# Patient Record
Sex: MALE | Race: BLACK OR AFRICAN AMERICAN | Employment: FULL TIME | URBAN - METROPOLITAN AREA
[De-identification: names, ages, dates, MRNs, and addresses within clinical notes are randomized per-mention and may not be internally consistent; named-entity substitution may affect disease eponyms.]

---

## 2019-02-03 ENCOUNTER — HOSPITAL ENCOUNTER (INPATIENT)
Age: 39
LOS: 2 days | Discharge: HOME | DRG: 293 | End: 2019-02-05
Attending: EMERGENCY MEDICINE | Admitting: INTERNAL MEDICINE
Payer: COMMERCIAL

## 2019-02-03 ENCOUNTER — APPOINTMENT (OUTPATIENT)
Dept: GENERAL RADIOLOGY | Age: 39
End: 2019-02-03
Attending: INTERNAL MEDICINE
Payer: COMMERCIAL

## 2019-02-03 ENCOUNTER — HOSPITAL ENCOUNTER (EMERGENCY)
Age: 39
Discharge: ANOTHER HOSPITAL | End: 2019-02-03
Attending: INTERNAL MEDICINE
Payer: COMMERCIAL

## 2019-02-03 VITALS
OXYGEN SATURATION: 96 % | HEART RATE: 103 BPM | BODY MASS INDEX: 39.58 KG/M2 | WEIGHT: 300 LBS | RESPIRATION RATE: 18 BRPM | DIASTOLIC BLOOD PRESSURE: 137 MMHG | SYSTOLIC BLOOD PRESSURE: 178 MMHG | TEMPERATURE: 98.3 F

## 2019-02-03 DIAGNOSIS — I21.4 NSTEMI (NON-ST ELEVATED MYOCARDIAL INFARCTION) (HCC): Primary | ICD-10-CM

## 2019-02-03 DIAGNOSIS — I50.9 ACUTE CONGESTIVE HEART FAILURE, UNSPECIFIED HEART FAILURE TYPE (HCC): ICD-10-CM

## 2019-02-03 DIAGNOSIS — I42.9 CARDIOMYOPATHY (HCC): ICD-10-CM

## 2019-02-03 LAB
ALBUMIN SERPL-MCNC: 2.4 G/DL (ref 3.5–5)
ALBUMIN/GLOB SERPL: 0.6 {RATIO} (ref 1.1–2.2)
ALP SERPL-CCNC: 100 U/L (ref 45–117)
ALT SERPL-CCNC: 39 U/L (ref 12–78)
ANION GAP SERPL CALC-SCNC: 5 MMOL/L (ref 5–15)
APPEARANCE UR: CLEAR
AST SERPL-CCNC: 38 U/L (ref 15–37)
ATRIAL RATE: 112 BPM
BACTERIA URNS QL MICRO: NEGATIVE /HPF
BASOPHILS # BLD: 0 K/UL (ref 0–0.1)
BASOPHILS NFR BLD: 0 % (ref 0–1)
BILIRUB SERPL-MCNC: 0.9 MG/DL (ref 0.2–1)
BILIRUB UR QL CFM: NEGATIVE
BNP SERPL-MCNC: 7481 PG/ML (ref 0–125)
BUN SERPL-MCNC: 17 MG/DL (ref 6–20)
BUN/CREAT SERPL: 13 (ref 12–20)
CALCIUM SERPL-MCNC: 7.9 MG/DL (ref 8.5–10.1)
CALCULATED P AXIS, ECG09: 59 DEGREES
CALCULATED R AXIS, ECG10: -27 DEGREES
CALCULATED T AXIS, ECG11: 123 DEGREES
CHLORIDE SERPL-SCNC: 108 MMOL/L (ref 97–108)
CK SERPL-CCNC: 235 U/L (ref 39–308)
CO2 SERPL-SCNC: 30 MMOL/L (ref 21–32)
COLOR UR: ABNORMAL
CREAT SERPL-MCNC: 1.32 MG/DL (ref 0.7–1.3)
DIAGNOSIS, 93000: NORMAL
DIFFERENTIAL METHOD BLD: ABNORMAL
EOSINOPHIL # BLD: 0.1 K/UL (ref 0–0.4)
EOSINOPHIL NFR BLD: 1 % (ref 0–7)
EPITH CASTS URNS QL MICRO: NORMAL /LPF
ERYTHROCYTE [DISTWIDTH] IN BLOOD BY AUTOMATED COUNT: 14.4 % (ref 11.5–14.5)
GLOBULIN SER CALC-MCNC: 3.9 G/DL (ref 2–4)
GLUCOSE SERPL-MCNC: 93 MG/DL (ref 65–100)
GLUCOSE UR STRIP.AUTO-MCNC: NEGATIVE MG/DL
HCT VFR BLD AUTO: 46.6 % (ref 36.6–50.3)
HGB BLD-MCNC: 15 G/DL (ref 12.1–17)
HGB UR QL STRIP: NEGATIVE
IMM GRANULOCYTES # BLD AUTO: 0 K/UL (ref 0–0.04)
IMM GRANULOCYTES NFR BLD AUTO: 0 % (ref 0–0.5)
KETONES UR QL STRIP.AUTO: NEGATIVE MG/DL
LEUKOCYTE ESTERASE UR QL STRIP.AUTO: NEGATIVE
LYMPHOCYTES # BLD: 2.2 K/UL (ref 0.8–3.5)
LYMPHOCYTES NFR BLD: 17 % (ref 12–49)
MCH RBC QN AUTO: 29.9 PG (ref 26–34)
MCHC RBC AUTO-ENTMCNC: 32.2 G/DL (ref 30–36.5)
MCV RBC AUTO: 92.8 FL (ref 80–99)
MONOCYTES # BLD: 0.9 K/UL (ref 0–1)
MONOCYTES NFR BLD: 7 % (ref 5–13)
NEUTS SEG # BLD: 9.9 K/UL (ref 1.8–8)
NEUTS SEG NFR BLD: 75 % (ref 32–75)
NITRITE UR QL STRIP.AUTO: NEGATIVE
NRBC # BLD: 0 K/UL (ref 0–0.01)
NRBC BLD-RTO: 0 PER 100 WBC
P-R INTERVAL, ECG05: 138 MS
PH UR STRIP: 6 [PH] (ref 5–8)
PLATELET # BLD AUTO: 353 K/UL (ref 150–400)
PMV BLD AUTO: 10.3 FL (ref 8.9–12.9)
POTASSIUM SERPL-SCNC: 4.1 MMOL/L (ref 3.5–5.1)
PROT SERPL-MCNC: 6.3 G/DL (ref 6.4–8.2)
PROT UR STRIP-MCNC: >300 MG/DL
Q-T INTERVAL, ECG07: 340 MS
QRS DURATION, ECG06: 84 MS
QTC CALCULATION (BEZET), ECG08: 464 MS
RBC # BLD AUTO: 5.02 M/UL (ref 4.1–5.7)
RBC #/AREA URNS HPF: NORMAL /HPF (ref 0–5)
SODIUM SERPL-SCNC: 143 MMOL/L (ref 136–145)
SP GR UR REFRACTOMETRY: 1.02 (ref 1–1.03)
TROPONIN I SERPL-MCNC: 2.31 NG/ML
TROPONIN I SERPL-MCNC: 2.62 NG/ML
UROBILINOGEN UR QL STRIP.AUTO: 2 EU/DL (ref 0.2–1)
VENTRICULAR RATE, ECG03: 112 BPM
WBC # BLD AUTO: 13.3 K/UL (ref 4.1–11.1)
WBC URNS QL MICRO: NORMAL /HPF (ref 0–4)

## 2019-02-03 PROCEDURE — 74011250637 HC RX REV CODE- 250/637: Performed by: INTERNAL MEDICINE

## 2019-02-03 PROCEDURE — 71045 X-RAY EXAM CHEST 1 VIEW: CPT

## 2019-02-03 PROCEDURE — 84484 ASSAY OF TROPONIN QUANT: CPT

## 2019-02-03 PROCEDURE — 99285 EMERGENCY DEPT VISIT HI MDM: CPT

## 2019-02-03 PROCEDURE — 36415 COLL VENOUS BLD VENIPUNCTURE: CPT

## 2019-02-03 PROCEDURE — 65660000000 HC RM CCU STEPDOWN

## 2019-02-03 PROCEDURE — 77010033678 HC OXYGEN DAILY

## 2019-02-03 PROCEDURE — 74011250636 HC RX REV CODE- 250/636: Performed by: INTERNAL MEDICINE

## 2019-02-03 PROCEDURE — 93005 ELECTROCARDIOGRAM TRACING: CPT

## 2019-02-03 PROCEDURE — 74011250636 HC RX REV CODE- 250/636: Performed by: EMERGENCY MEDICINE

## 2019-02-03 PROCEDURE — 82550 ASSAY OF CK (CPK): CPT

## 2019-02-03 PROCEDURE — 81001 URINALYSIS AUTO W/SCOPE: CPT

## 2019-02-03 PROCEDURE — 85025 COMPLETE CBC W/AUTO DIFF WBC: CPT

## 2019-02-03 PROCEDURE — 96374 THER/PROPH/DIAG INJ IV PUSH: CPT

## 2019-02-03 PROCEDURE — 83880 ASSAY OF NATRIURETIC PEPTIDE: CPT

## 2019-02-03 PROCEDURE — 74011250637 HC RX REV CODE- 250/637: Performed by: EMERGENCY MEDICINE

## 2019-02-03 PROCEDURE — 80053 COMPREHEN METABOLIC PANEL: CPT

## 2019-02-03 PROCEDURE — 96372 THER/PROPH/DIAG INJ SC/IM: CPT

## 2019-02-03 RX ORDER — LISINOPRIL 20 MG/1
20 TABLET ORAL DAILY
Status: DISCONTINUED | OUTPATIENT
Start: 2019-02-04 | End: 2019-02-05 | Stop reason: HOSPADM

## 2019-02-03 RX ORDER — BENZONATATE 100 MG/1
100 CAPSULE ORAL
Status: COMPLETED | OUTPATIENT
Start: 2019-02-03 | End: 2019-02-03

## 2019-02-03 RX ORDER — HYDRALAZINE HYDROCHLORIDE 20 MG/ML
10 INJECTION INTRAMUSCULAR; INTRAVENOUS
Status: DISCONTINUED | OUTPATIENT
Start: 2019-02-03 | End: 2019-02-05 | Stop reason: HOSPADM

## 2019-02-03 RX ORDER — FUROSEMIDE 10 MG/ML
40 INJECTION INTRAMUSCULAR; INTRAVENOUS 2 TIMES DAILY
Status: DISCONTINUED | OUTPATIENT
Start: 2019-02-04 | End: 2019-02-05

## 2019-02-03 RX ORDER — ONDANSETRON 2 MG/ML
4 INJECTION INTRAMUSCULAR; INTRAVENOUS
Status: DISCONTINUED | OUTPATIENT
Start: 2019-02-03 | End: 2019-02-05 | Stop reason: HOSPADM

## 2019-02-03 RX ORDER — GUAIFENESIN 100 MG/5ML
324 LIQUID (ML) ORAL
Status: COMPLETED | OUTPATIENT
Start: 2019-02-03 | End: 2019-02-03

## 2019-02-03 RX ORDER — LABETALOL HCL 20 MG/4 ML
20 SYRINGE (ML) INTRAVENOUS
Status: COMPLETED | OUTPATIENT
Start: 2019-02-03 | End: 2019-02-03

## 2019-02-03 RX ORDER — METOPROLOL SUCCINATE 25 MG/1
25 TABLET, EXTENDED RELEASE ORAL
Status: DISCONTINUED | OUTPATIENT
Start: 2019-02-03 | End: 2019-02-04

## 2019-02-03 RX ORDER — FUROSEMIDE 40 MG/1
40 TABLET ORAL DAILY
Status: DISCONTINUED | OUTPATIENT
Start: 2019-02-04 | End: 2019-02-03

## 2019-02-03 RX ORDER — FUROSEMIDE 10 MG/ML
80 INJECTION INTRAMUSCULAR; INTRAVENOUS
Status: COMPLETED | OUTPATIENT
Start: 2019-02-03 | End: 2019-02-03

## 2019-02-03 RX ORDER — METOPROLOL SUCCINATE 25 MG/1
25 TABLET, EXTENDED RELEASE ORAL DAILY
Status: DISCONTINUED | OUTPATIENT
Start: 2019-02-04 | End: 2019-02-03

## 2019-02-03 RX ORDER — ENOXAPARIN SODIUM 150 MG/ML
135 INJECTION SUBCUTANEOUS
Status: ACTIVE | OUTPATIENT
Start: 2019-02-03 | End: 2019-02-04

## 2019-02-03 RX ADMIN — FUROSEMIDE 80 MG: 10 INJECTION, SOLUTION INTRAMUSCULAR; INTRAVENOUS at 16:06

## 2019-02-03 RX ADMIN — HYDRALAZINE HYDROCHLORIDE 10 MG: 20 INJECTION INTRAMUSCULAR; INTRAVENOUS at 20:01

## 2019-02-03 RX ADMIN — ASPIRIN 81 MG 324 MG: 81 TABLET ORAL at 13:50

## 2019-02-03 RX ADMIN — HYDROCHLOROTHIAZIDE: 25 TABLET ORAL at 12:29

## 2019-02-03 RX ADMIN — ENOXAPARIN SODIUM 140 MG: 100 INJECTION, SOLUTION INTRAVENOUS; SUBCUTANEOUS at 14:15

## 2019-02-03 RX ADMIN — NITROGLYCERIN 1 INCH: 20 OINTMENT TOPICAL at 16:08

## 2019-02-03 RX ADMIN — LABETALOL HYDROCHLORIDE 20 MG: 5 INJECTION, SOLUTION INTRAVENOUS at 14:31

## 2019-02-03 RX ADMIN — BENZONATATE 100 MG: 100 CAPSULE ORAL at 12:29

## 2019-02-03 RX ADMIN — METOPROLOL SUCCINATE 25 MG: 25 TABLET, EXTENDED RELEASE ORAL at 21:40

## 2019-02-03 NOTE — ED PROVIDER NOTES
EMERGENCY DEPARTMENT HISTORY AND PHYSICAL EXAM      Date: 2/3/2019  Patient Name: Yakov Mendieta    History of Presenting Illness     Chief Complaint   Patient presents with    Cough       History Provided By: Patient    HPI: Yakov Mendieta, 41 y.o. male with PMHx significant for HTN, presents ambulatory to the ED with cc of a persistent dry cough x 1 month as well as BL ankle swelling x 1 week. Pt explains that he is a current smoker but is attempting to quit. He notes a hx of HTN but states he has been out of his medication for ~ 1 year. Pt reports that he has been attempting to get with his PCP but has been unable to due to his work schedule. He denies any OTC medications or other notable modifying factors. Pt specifically denies any fever, chills, SOB, CP, HA, N/V/D, abdominal pain, dysuria, hematuria, or rash. There are no other complaints, changes, or physical findings at this time. Social Hx: + EtOH (social); + Smoker; - Illicit Drugs     PCP: Bobyb Chaney MD    Current Outpatient Medications   Medication Sig Dispense Refill    losartan-hydrochlorothiazide (HYZAAR) 100-25 mg per tablet       amlodipine (NORVASC) 10 mg tablet Take 10 mg by mouth daily.        Facility-Administered Medications Ordered in Other Encounters   Medication Dose Route Frequency Provider Last Rate Last Dose    enoxaparin (LOVENOX) injection 135 mg  135 mg SubCUTAneous NOW Dee Mayfield DO   Stopped at 02/03/19 1646    [START ON 2/4/2019] lisinopril (PRINIVIL, ZESTRIL) tablet 20 mg  20 mg Oral DAILY Gloria Van MD        [START ON 2/4/2019] furosemide (LASIX) tablet 40 mg  40 mg Oral DAILY Gloria Van MD        [START ON 2/4/2019] metoprolol succinate (TOPROL-XL) XL tablet 25 mg  25 mg Oral DAILY Gloria Van MD           Past History     Past Medical History:  Past Medical History:   Diagnosis Date    Hypertension     Infectious disease     trichomonas        Past Surgical History:  No past surgical history on file. Family History:  No family history on file. Social History:  Social History     Tobacco Use    Smoking status: Former Smoker     Packs/day: 0.50     Last attempt to quit: 6/15/2014     Years since quittin.6   Substance Use Topics    Alcohol use: Yes     Comment: socially    Drug use: No       Allergies:  No Known Allergies    Review of Systems   Review of Systems   Constitutional: Negative. Negative for chills and fever. HENT: Negative. Negative for congestion, facial swelling, rhinorrhea, sore throat, trouble swallowing and voice change. Eyes: Negative. Respiratory: Positive for cough. Negative for apnea, chest tightness, shortness of breath and wheezing. Cardiovascular: Negative. Negative for chest pain, palpitations and leg swelling. Gastrointestinal: Negative. Negative for abdominal distention, abdominal pain, blood in stool, constipation, diarrhea, nausea and vomiting. Endocrine: Negative. Negative for cold intolerance, heat intolerance and polyuria. Genitourinary: Negative. Negative for difficulty urinating, dysuria, flank pain, frequency, hematuria and urgency. Musculoskeletal: Positive for joint swelling (BL ankles). Negative for arthralgias, back pain, myalgias, neck pain and neck stiffness. Skin: Negative. Negative for color change and rash. Neurological: Negative. Negative for dizziness, syncope, facial asymmetry, speech difficulty, weakness, light-headedness, numbness and headaches. Hematological: Negative. Does not bruise/bleed easily. Psychiatric/Behavioral: Negative. Negative for confusion and self-injury. The patient is not nervous/anxious. Physical Exam   Physical Exam   Constitutional: He is oriented to person, place, and time. He appears well-developed and well-nourished. Obese male in NAD   HENT:   Head: Normocephalic and atraumatic.    Mouth/Throat: Oropharynx is clear and moist and mucous membranes are normal.   Eyes: EOM are normal.   Neck: Normal range of motion and full passive range of motion without pain. Neck supple. Cardiovascular: Normal rate, regular rhythm, normal heart sounds, intact distal pulses and normal pulses. No murmur heard. Pulmonary/Chest: Effort normal and breath sounds normal. No respiratory distress. He has no wheezes. He exhibits no tenderness. Abdominal: Soft. Normal appearance and bowel sounds are normal. There is no tenderness. There is no rebound and no guarding. Musculoskeletal: He exhibits edema. 2+ pitting edema BL ankles   Neurological: He is alert and oriented to person, place, and time. He has normal strength. Skin: Skin is warm, dry and intact. No rash noted. No erythema. Psychiatric: He has a normal mood and affect. His speech is normal and behavior is normal. Judgment and thought content normal.   Nursing note and vitals reviewed.     Diagnostic Study Results     Labs -     Recent Results (from the past 12 hour(s))   EKG, 12 LEAD, INITIAL    Collection Time: 02/03/19 12:21 PM   Result Value Ref Range    Ventricular Rate 112 BPM    Atrial Rate 112 BPM    P-R Interval 138 ms    QRS Duration 84 ms    Q-T Interval 340 ms    QTC Calculation (Bezet) 464 ms    Calculated P Axis 59 degrees    Calculated R Axis -27 degrees    Calculated T Axis 123 degrees    Diagnosis       Sinus tachycardia  Possible Left atrial enlargement  ST & T wave abnormality, consider lateral ischemia  Abnormal ECG  No previous ECGs available     CK W/ REFLX CKMB    Collection Time: 02/03/19 12:36 PM   Result Value Ref Range     39 - 308 U/L   TROPONIN I    Collection Time: 02/03/19 12:36 PM   Result Value Ref Range    Troponin-I, Qt. 2.31 (H) <0.05 ng/mL   URINALYSIS W/ RFLX MICROSCOPIC    Collection Time: 02/03/19 12:36 PM   Result Value Ref Range    Color DARK YELLOW      Appearance CLEAR CLEAR      Specific gravity 1.025 1.003 - 1.030      pH (UA) 6.0 5.0 - 8.0      Protein >300 (A) NEG mg/dL    Glucose NEGATIVE  NEG mg/dL    Ketone NEGATIVE  NEG mg/dL    Blood NEGATIVE  NEG      Urobilinogen 2.0 (H) 0.2 - 1.0 EU/dL    Nitrites NEGATIVE  NEG      Leukocyte Esterase NEGATIVE  NEG     METABOLIC PANEL, COMPREHENSIVE    Collection Time: 02/03/19 12:36 PM   Result Value Ref Range    Sodium 143 136 - 145 mmol/L    Potassium 4.1 3.5 - 5.1 mmol/L    Chloride 108 97 - 108 mmol/L    CO2 30 21 - 32 mmol/L    Anion gap 5 5 - 15 mmol/L    Glucose 93 65 - 100 mg/dL    BUN 17 6 - 20 MG/DL    Creatinine 1.32 (H) 0.70 - 1.30 MG/DL    BUN/Creatinine ratio 13 12 - 20      GFR est AA >60 >60 ml/min/1.73m2    GFR est non-AA >60 >60 ml/min/1.73m2    Calcium 7.9 (L) 8.5 - 10.1 MG/DL    Bilirubin, total 0.9 0.2 - 1.0 MG/DL    ALT (SGPT) 39 12 - 78 U/L    AST (SGOT) 38 (H) 15 - 37 U/L    Alk. phosphatase 100 45 - 117 U/L    Protein, total 6.3 (L) 6.4 - 8.2 g/dL    Albumin 2.4 (L) 3.5 - 5.0 g/dL    Globulin 3.9 2.0 - 4.0 g/dL    A-G Ratio 0.6 (L) 1.1 - 2.2     CBC WITH AUTOMATED DIFF    Collection Time: 02/03/19 12:36 PM   Result Value Ref Range    WBC 13.3 (H) 4.1 - 11.1 K/uL    RBC 5.02 4.10 - 5.70 M/uL    HGB 15.0 12.1 - 17.0 g/dL    HCT 46.6 36.6 - 50.3 %    MCV 92.8 80.0 - 99.0 FL    MCH 29.9 26.0 - 34.0 PG    MCHC 32.2 30.0 - 36.5 g/dL    RDW 14.4 11.5 - 14.5 %    PLATELET 158 502 - 629 K/uL    MPV 10.3 8.9 - 12.9 FL    NRBC 0.0 0  WBC    ABSOLUTE NRBC 0.00 0.00 - 0.01 K/uL    NEUTROPHILS 75 32 - 75 %    LYMPHOCYTES 17 12 - 49 %    MONOCYTES 7 5 - 13 %    EOSINOPHILS 1 0 - 7 %    BASOPHILS 0 0 - 1 %    IMMATURE GRANULOCYTES 0 0.0 - 0.5 %    ABS. NEUTROPHILS 9.9 (H) 1.8 - 8.0 K/UL    ABS. LYMPHOCYTES 2.2 0.8 - 3.5 K/UL    ABS. MONOCYTES 0.9 0.0 - 1.0 K/UL    ABS. EOSINOPHILS 0.1 0.0 - 0.4 K/UL    ABS. BASOPHILS 0.0 0.0 - 0.1 K/UL    ABS. IMM.  GRANS. 0.0 0.00 - 0.04 K/UL    DF AUTOMATED     NT-PRO BNP    Collection Time: 02/03/19 12:36 PM   Result Value Ref Range    NT pro-BNP 7,481 (H) 0 - 125 PG/ML   URINE MICROSCOPIC ONLY    Collection Time: 02/03/19 12:36 PM   Result Value Ref Range    WBC 0-4 0 - 4 /hpf    RBC 0-5 0 - 5 /hpf    Epithelial cells FEW FEW /lpf    Bacteria NEGATIVE  NEG /hpf   BILIRUBIN, CONFIRM    Collection Time: 02/03/19 12:36 PM   Result Value Ref Range    Bilirubin UA, confirm NEGATIVE  NEG         Radiologic Studies -   XR CHEST PORT   Final Result   IMPRESSION: Marked enlargement of the cardiac silhouette with pulmonary vascular   congestion. Differential diagnosis includes cardiomegaly versus pericardial   effusion. CT Results  (Last 48 hours)    None        CXR Results  (Last 48 hours)               02/03/19 1241  XR CHEST PORT Final result    Impression:  IMPRESSION: Marked enlargement of the cardiac silhouette with pulmonary vascular   congestion. Differential diagnosis includes cardiomegaly versus pericardial   effusion. Narrative:  INDICATION:  sob, cough       COMPARISON: 2/26/2013       FINDINGS: Single AP portable view of the chest obtained at 1232 demonstrates   marked enlargement of the cardiac silhouette with pulmonary vascular congestion. The lungs are clear bilaterally. No osseous abnormalities are seen. Medical Decision Making   I am the first provider for this patient. I reviewed the vital signs, available nursing notes, past medical history, past surgical history, family history and social history. Vital Signs-Reviewed the patient's vital signs.   Patient Vitals for the past 12 hrs:   Temp Pulse Resp BP SpO2   02/03/19 1416 -- (!) 103 -- (!) 178/137 96 %   02/03/19 1400 -- (!) 101 -- (!) 178/140 92 %   02/03/19 1300 -- (!) 105 -- (!) 174/132 94 %   02/03/19 1231 -- -- -- (!) 183/133 --   02/03/19 1200 -- -- -- (!) 192/137 93 %   02/03/19 1155 98.3 °F (36.8 °C) (!) 113 18 (!) 191/137 96 %       Pulse Oximetry Analysis - 96% on RA    Cardiac Monitor:   Rate: 113 bpm  Rhythm: Sinus Tachycardia      EKG interpretation: (Preliminary)1221  Rhythm: sinus tachycardia; and regular . Rate (approx.): 112; Axis: normal; WI interval: normal; QRS interval: normal ; ST/T wave: ST and T wave abnormality; Other findings: possible L atrial enlargement, no prior to compare. Written by Gaby Caballero. Shaw Medellin, ED Scribe, as dictated by Avila Choudhary MD    Records Reviewed: Nursing Notes and Old Medical Records    Provider Notes (Medical Decision Making):   DDx: CHF, uncontrolled HTN, smokers cough, atypical PNA, lung CA    ED Course:   Initial assessment performed. The patients presenting problems have been discussed, and they are in agreement with the care plan formulated and outlined with them. I have encouraged them to ask questions as they arise throughout their visit. 1:54 PM  Pt has been reassessed by Avila Choudhary MD and updated on BW. Discussed transfer to [address] [place] [address] with pt who is agreeable with plan. Consult Note:  2:12 PM  Avila Choudhary MD, spoke with Jackelin Hamilton DO,  Specialty: ER at [address] [place] [address]  Discussed pt's hx, disposition, and available diagnostic and imaging results. Reviewed care plans. Consultant accepts pt for transfer    2:44 PM  LAST LOOK:   Temp Pulse Resp BP SpO2   02/03/19 1416 -- (!) 103 -- (!) 178/137 96 %     Just prior to transfer, I re-evaluated the patient. Vitals reviewed and patient/family given a chance to ask questions. All agree with the plan to transfer. At this time, I feel that pt is stable for transfer. Critical Care Time:   CRITICAL CARE NOTE :  2:13 PM    IMPENDING DETERIORATION -Cardiovascular  ASSOCIATED RISK FACTORS - Dysrhythmia  MANAGEMENT- Bedside Assessment  INTERPRETATION -  ECG  INTERVENTIONS - hemodynamic mngmt  CASE REVIEW - ER MD  TREATMENT RESPONSE -Stable  PERFORMED BY - Self    NOTES   :  I have spent 15 minutes of critical care time involved in lab review, consultations with specialist, family decision- making, bedside attention and documentation.  During this entire length of time I was immediately available to the patient . Catarino Dominguez MD    Disposition:  Transfer Note:  Patient is being transferred to ED [facility] ER, transfer accepted by Dr. Xochilt Desai. The reasons for patient's transfer have been discussed with the patient and available family. They convey agreement and understanding for the need to be transferred as explained to them by Catarino Dominguez MD      PLAN:  1. Transfer to [facility] ED    Diagnosis     Clinical Impression:   1. NSTEMI (non-ST elevated myocardial infarction) ([facility] [address]        This note is prepared by Hubert Graves. Juan Baltazar, acting as Scribe for MD Catarino Canchola MD: The scribe's documentation has been prepared under my direction and personally reviewed by me in its entirety. I confirm that the note above accurately reflects all work, treatment, procedures, and medical decision making performed by me. This note will not be viewable in [address]

## 2019-02-03 NOTE — ED TRIAGE NOTES
Pt presents to ED via EMS from [facility] for NSTEMI. Pt presented at [facility] for c/o cough and leg swelling. Pt was found to have elevated BP and elevated troponin level. Pt A&Ox4. Pt admits he has not been taking BP meds. Pt denies CP, SOB.

## 2019-02-03 NOTE — ED NOTES
TRANSFER - OUT REPORT:    Verbal report given to Richa Boyce on Roxie Curran  being transferred to [facility] for routine progression of care       Report consisted of patients Situation, Background, Assessment and   Recommendations(SBAR). Information from the following report(s) SBAR, Kardex, ED Summary and MAR was reviewed with the receiving nurse. Lines:       Opportunity for questions and clarification was provided.       Patient transported with:   transport tech

## 2019-02-03 NOTE — ED TRIAGE NOTES
Patient presents to ED with c/o cough for 1 month. Patient also complains of swelling ing legs since last week. Has not been taking blood pressure for 2 months.

## 2019-02-03 NOTE — PROGRESS NOTES
Bedside and Verbal shift change report given to Christelle Nevarez RN (oncoming nurse). Report included the following information SBAR, Kardex, ED Summary, Procedure Summary, Intake/Output, MAR and Recent Results. SHIFT SUMMARY:  939: pt not yet on unit, but dr fatimah garnica texted about prn bp meds. MD to place orders.

## 2019-02-03 NOTE — ED NOTES
[facility]. Patient alert and oriented and in no acute distress, respirations even and unlabored. Vital signs stable.

## 2019-02-03 NOTE — PROGRESS NOTES
TRANSFER - IN REPORT:    Verbal report received from [facility] (name) on Yayo Garcia  being received from ED (unit) for routine progression of care      Report consisted of patients Situation, Background, Assessment and   Recommendations(SBAR). Information from the following report(s) SBAR, Kardex, ED Summary, Procedure Summary, Intake/Output, MAR and Recent Results was reviewed with the receiving nurse. Opportunity for questions and clarification was provided. Assessment completed upon patients arrival to unit and care assumed.

## 2019-02-03 NOTE — ED PROVIDER NOTES
EMERGENCY DEPARTMENT HISTORY AND PHYSICAL EXAM      Date: 2/3/2019  Patient Name: Yakov Mendieta    History of Presenting Illness     Chief Complaint   Patient presents with    Other     transfer (NSTEMI)       History Provided By: Patient    HPI: Yakov Mendieta, 41 y.o. male with PMHx significant for HTN, presents via EMS from [facility] to the ED with cc of gradual onset NSTEMI, with associated sxs of cough and SOB, onset ~1 month ago. Pt also c/o bilateral feet edema, onset ~1 week ago. Pt reports SOB exacerbated when laying flat on back. He reports having a stress test performed several years ago, with normal results. Pt reports social hx of smoking marijuana and drinking alcohol. Pt denies cocaine use. Pt denies nausea/vomiting/diarrhea, chest pain, or HA. There are no other complaints, changes, or physical findings at this time. PCP: Bobby Chaney MD    Current Facility-Administered Medications on File Prior to Encounter   Medication Dose Route Frequency Provider Last Rate Last Dose    [COMPLETED] losartan/hydroCHLOROthiazide (HYZAAR) 100/12.5 mg   Oral NOW Michaele Sandhoff I, MD        [COMPLETED] benzonatate (TESSALON) capsule 100 mg  100 mg Oral NOW Michaele Sandhoff I, MD   100 mg at 02/03/19 1229    [COMPLETED] aspirin chewable tablet 324 mg  324 mg Oral NOW Michaele Sandhoff I, MD   324 mg at 02/03/19 1350    [COMPLETED] enoxaparin (LOVENOX) 140 mg  140 mg SubCUTAneous NOW Michaele Sandhoff I, MD   140 mg at 02/03/19 1415    [COMPLETED] labetalol (NORMODYNE;TRANDATE) 20 mg/4 mL (5 mg/mL) injection 20 mg  20 mg IntraVENous NOW Michaele Sandhoff I, MD   20 mg at 02/03/19 1431     Current Outpatient Medications on File Prior to Encounter   Medication Sig Dispense Refill    losartan-hydrochlorothiazide (HYZAAR) 100-25 mg per tablet       amlodipine (NORVASC) 10 mg tablet Take 10 mg by mouth daily.          Past History     Past Medical History:  Past Medical History:   Diagnosis Date    Hypertension     Infectious disease     trichomonas        Past Surgical History:  No past surgical history on file. Family History:  No family history on file. Social History:  Social History     Tobacco Use    Smoking status: Former Smoker     Packs/day: 0.50     Last attempt to quit: 6/15/2014     Years since quittin.6   Substance Use Topics    Alcohol use: Yes     Comment: socially    Drug use: No       Allergies:  No Known Allergies      Review of Systems   Review of Systems   Constitutional: Negative. Negative for appetite change, chills, fatigue and fever. HENT: Negative. Negative for congestion, rhinorrhea, sinus pressure and sore throat. Eyes: Negative. Respiratory: Positive for cough and shortness of breath. Negative for choking, chest tightness and wheezing. Cardiovascular: Negative. Negative for chest pain, palpitations and leg swelling. Gastrointestinal: Negative for abdominal pain, constipation, diarrhea, nausea and vomiting. Endocrine: Negative. Genitourinary: Negative. Negative for difficulty urinating, dysuria, flank pain and urgency. Musculoskeletal: Positive for joint swelling (bilateral feet). Skin: Negative. Neurological: Negative. Negative for dizziness, speech difficulty, weakness, light-headedness, numbness and headaches. Psychiatric/Behavioral: Negative. All other systems reviewed and are negative. Physical Exam   Physical Exam   Constitutional: He is oriented to person, place, and time. He appears well-developed and well-nourished. No distress. HENT:   Head: Normocephalic and atraumatic. Mouth/Throat: Oropharynx is clear and moist. No oropharyngeal exudate. Eyes: Conjunctivae and EOM are normal. Pupils are equal, round, and reactive to light. Neck: Normal range of motion. Neck supple. No JVD present. No tracheal deviation present. Cardiovascular: Normal rate, regular rhythm, normal heart sounds and intact distal pulses.    No murmur heard.  Pulmonary/Chest: Effort normal. No stridor. No respiratory distress. He has no wheezes. He has rales. He exhibits no tenderness. Abdominal: Soft. He exhibits no distension. There is no tenderness. There is no rebound and no guarding. Morbidly obese   Musculoskeletal: Normal range of motion. He exhibits edema (2+ up to mid thigh rony lower extremities). He exhibits no tenderness. Neurological: He is alert and oriented to person, place, and time. No cranial nerve deficit. No gross motor or sensory deficits    Skin: Skin is warm and dry. He is not diaphoretic. Psychiatric: He has a normal mood and affect. His behavior is normal.   Nursing note and vitals reviewed.       Diagnostic Study Results     Labs -     Recent Results (from the past 12 hour(s))   EKG, 12 LEAD, INITIAL    Collection Time: 02/03/19 12:21 PM   Result Value Ref Range    Ventricular Rate 112 BPM    Atrial Rate 112 BPM    P-R Interval 138 ms    QRS Duration 84 ms    Q-T Interval 340 ms    QTC Calculation (Bezet) 464 ms    Calculated P Axis 59 degrees    Calculated R Axis -27 degrees    Calculated T Axis 123 degrees    Diagnosis       Sinus tachycardia  Possible Left atrial enlargement  ST & T wave abnormality, consider lateral ischemia  Abnormal ECG  No previous ECGs available     CK W/ REFLX CKMB    Collection Time: 02/03/19 12:36 PM   Result Value Ref Range     39 - 308 U/L   TROPONIN I    Collection Time: 02/03/19 12:36 PM   Result Value Ref Range    Troponin-I, Qt. 2.31 (H) <0.05 ng/mL   URINALYSIS W/ RFLX MICROSCOPIC    Collection Time: 02/03/19 12:36 PM   Result Value Ref Range    Color DARK YELLOW      Appearance CLEAR CLEAR      Specific gravity 1.025 1.003 - 1.030      pH (UA) 6.0 5.0 - 8.0      Protein >300 (A) NEG mg/dL    Glucose NEGATIVE  NEG mg/dL    Ketone NEGATIVE  NEG mg/dL    Blood NEGATIVE  NEG      Urobilinogen 2.0 (H) 0.2 - 1.0 EU/dL    Nitrites NEGATIVE  NEG      Leukocyte Esterase NEGATIVE  NEG     METABOLIC PANEL, COMPREHENSIVE    Collection Time: 02/03/19 12:36 PM   Result Value Ref Range    Sodium 143 136 - 145 mmol/L    Potassium 4.1 3.5 - 5.1 mmol/L    Chloride 108 97 - 108 mmol/L    CO2 30 21 - 32 mmol/L    Anion gap 5 5 - 15 mmol/L    Glucose 93 65 - 100 mg/dL    BUN 17 6 - 20 MG/DL    Creatinine 1.32 (H) 0.70 - 1.30 MG/DL    BUN/Creatinine ratio 13 12 - 20      GFR est AA >60 >60 ml/min/1.73m2    GFR est non-AA >60 >60 ml/min/1.73m2    Calcium 7.9 (L) 8.5 - 10.1 MG/DL    Bilirubin, total 0.9 0.2 - 1.0 MG/DL    ALT (SGPT) 39 12 - 78 U/L    AST (SGOT) 38 (H) 15 - 37 U/L    Alk. phosphatase 100 45 - 117 U/L    Protein, total 6.3 (L) 6.4 - 8.2 g/dL    Albumin 2.4 (L) 3.5 - 5.0 g/dL    Globulin 3.9 2.0 - 4.0 g/dL    A-G Ratio 0.6 (L) 1.1 - 2.2     CBC WITH AUTOMATED DIFF    Collection Time: 02/03/19 12:36 PM   Result Value Ref Range    WBC 13.3 (H) 4.1 - 11.1 K/uL    RBC 5.02 4.10 - 5.70 M/uL    HGB 15.0 12.1 - 17.0 g/dL    HCT 46.6 36.6 - 50.3 %    MCV 92.8 80.0 - 99.0 FL    MCH 29.9 26.0 - 34.0 PG    MCHC 32.2 30.0 - 36.5 g/dL    RDW 14.4 11.5 - 14.5 %    PLATELET 372 921 - 166 K/uL    MPV 10.3 8.9 - 12.9 FL    NRBC 0.0 0  WBC    ABSOLUTE NRBC 0.00 0.00 - 0.01 K/uL    NEUTROPHILS 75 32 - 75 %    LYMPHOCYTES 17 12 - 49 %    MONOCYTES 7 5 - 13 %    EOSINOPHILS 1 0 - 7 %    BASOPHILS 0 0 - 1 %    IMMATURE GRANULOCYTES 0 0.0 - 0.5 %    ABS. NEUTROPHILS 9.9 (H) 1.8 - 8.0 K/UL    ABS. LYMPHOCYTES 2.2 0.8 - 3.5 K/UL    ABS. MONOCYTES 0.9 0.0 - 1.0 K/UL    ABS. EOSINOPHILS 0.1 0.0 - 0.4 K/UL    ABS. BASOPHILS 0.0 0.0 - 0.1 K/UL    ABS. IMM.  GRANS. 0.0 0.00 - 0.04 K/UL    DF AUTOMATED     NT-PRO BNP    Collection Time: 02/03/19 12:36 PM   Result Value Ref Range    NT pro-BNP 7,481 (H) 0 - 125 PG/ML   URINE MICROSCOPIC ONLY    Collection Time: 02/03/19 12:36 PM   Result Value Ref Range    WBC 0-4 0 - 4 /hpf    RBC 0-5 0 - 5 /hpf    Epithelial cells FEW FEW /lpf    Bacteria NEGATIVE  NEG /hpf   BILIRUBIN, CONFIRM Collection Time: 02/03/19 12:36 PM   Result Value Ref Range    Bilirubin UA, confirm NEGATIVE  NEG         CXR Results  (Last 48 hours)               02/03/19 1241  XR CHEST PORT Final result    Impression:  IMPRESSION: Marked enlargement of the cardiac silhouette with pulmonary vascular   congestion. Differential diagnosis includes cardiomegaly versus pericardial   effusion. Narrative:  INDICATION:  sob, cough       COMPARISON: 2/26/2013       FINDINGS: Single AP portable view of the chest obtained at 1232 demonstrates   marked enlargement of the cardiac silhouette with pulmonary vascular congestion. The lungs are clear bilaterally. No osseous abnormalities are seen. Medical Decision Making   I am the first provider for this patient. I reviewed the vital signs, available nursing notes, past medical history, past surgical history, family history and social history. Vital Signs-Reviewed the patient's vital signs. Patient Vitals for the past 12 hrs:   Temp Pulse Resp BP SpO2   02/03/19 1519 97.3 °F (36.3 °C) 86 22 (!) 179/129 97 %       Pulse Oximetry Analysis - 97% on RA    Cardiac Monitor:   Rate: 86 bpm  Rhythm: Normal Sinus Rhythm 1519     Records Reviewed: Nursing Notes, Old Medical Records and Ambulance Run Sheet    Provider Notes (Medical Decision Making):   DDx: NSTEMI, CHF    ED Course:   Initial assessment performed. The patients presenting problems have been discussed, and they are in agreement with the care plan formulated and outlined with them. I have encouraged them to ask questions as they arise throughout their visit. Upon arrival to the ED, ED records from [facility] were reviewed. Pt hypertensive upon arrival and pt had been given ASA at [facility], Pt was given dose of Lovenox, IV Lasix, and an inch of Nitro paste was placed on patient. Will consult cardiology.  Yan Case DO      Consult Note:  4:36 PM  Niya Hale DO spoke with Genesis Shah MD,  Specialty: Cardiology   Discussed pt's hx, disposition, and available diagnostic and imaging results. Reviewed care plans. Consultant agrees with plans as outlined. Anh Armendariz MD will come evaluate pt and admit. Disposition:  Admit Note:  4:37 PM  Pt is being admitted by Anh Armendariz MD. The results of their tests and reason(s) for their admission have been discussed with pt and/or available family. They convey agreement and understanding for the need to be admitted and for admission diagnosis. Diagnosis     Clinical Impression:   1. NSTEMI (non-ST elevated myocardial infarction) ([facility] [address]    2. Acute congestive heart failure, unspecified heart failure type [facility])        Attestations: This note is prepared by Kit Lucia, acting as Scribe for DO Justice Sanchez DO: The scribe's documentation has been prepared under my direction and personally reviewed by me in its entirety. I confirm that the note above accurately reflects all work, treatment, procedures, and medical decision making performed by me.

## 2019-02-03 NOTE — ED NOTES
TRANSFER - IN REPORT:    Verbal report received from SREEDHAR Hernandez(name) on Martine Maldonado  being received from Emergency Department(unit) for routine progression of care      Report consisted of patients Situation, Background, Assessment and   Recommendations(SBAR). Information from the following report(s) SBAR, Kardex, ED Summary, MAR, Recent Results and Cardiac Rhythm nsr was reviewed with the receiving nurse. Opportunity for questions and clarification was provided. Assessment completed upon patients arrival to unit and care assumed.

## 2019-02-04 ENCOUNTER — APPOINTMENT (OUTPATIENT)
Dept: NON INVASIVE DIAGNOSTICS | Age: 39
DRG: 293 | End: 2019-02-04
Attending: INTERNAL MEDICINE
Payer: COMMERCIAL

## 2019-02-04 LAB
ANION GAP SERPL CALC-SCNC: 9 MMOL/L (ref 5–15)
ATRIAL RATE: 86 BPM
BUN SERPL-MCNC: 15 MG/DL (ref 6–20)
BUN/CREAT SERPL: 14 (ref 12–20)
CALCIUM SERPL-MCNC: 8.1 MG/DL (ref 8.5–10.1)
CALCULATED P AXIS, ECG09: 51 DEGREES
CALCULATED R AXIS, ECG10: -26 DEGREES
CALCULATED T AXIS, ECG11: -135 DEGREES
CHLORIDE SERPL-SCNC: 104 MMOL/L (ref 97–108)
CO2 SERPL-SCNC: 25 MMOL/L (ref 21–32)
COMMENT, HOLDF: NORMAL
CREAT SERPL-MCNC: 1.11 MG/DL (ref 0.7–1.3)
DIAGNOSIS, 93000: NORMAL
ECHO AO ROOT DIAM: 3.75 CM
ECHO AV AREA PEAK VELOCITY: 3.8 CM2
ECHO AV AREA VTI: 4.3 CM2
ECHO AV AREA/BSA PEAK VELOCITY: 1.4 CM2/M2
ECHO AV AREA/BSA VTI: 1.6 CM2/M2
ECHO AV MEAN GRADIENT: 2.3 MMHG
ECHO AV PEAK GRADIENT: 3.6 MMHG
ECHO AV PEAK VELOCITY: 94.8 CM/S
ECHO AV VTI: 15.8 CM
ECHO EST RA PRESSURE: 10 MMHG
ECHO LV INTERNAL DIMENSION DIASTOLIC: 6.96 CM (ref 4.2–5.9)
ECHO LV INTERNAL DIMENSION SYSTOLIC: 5.72 CM
ECHO LV IVSD: 1.17 CM (ref 0.6–1)
ECHO LV MASS 2D: 516.3 G (ref 88–224)
ECHO LV MASS INDEX 2D: 197.8 G/M2 (ref 49–115)
ECHO LV POSTERIOR WALL DIASTOLIC: 1.33 CM (ref 0.6–1)
ECHO LVOT DIAM: 2.17 CM
ECHO LVOT PEAK GRADIENT: 3.8 MMHG
ECHO LVOT PEAK VELOCITY: 98.05 CM/S
ECHO LVOT SV: 67.8 ML
ECHO LVOT VTI: 18.28 CM
ECHO MV A VELOCITY: 34.02 CM/S
ECHO MV AREA PHT: 4.1 CM2
ECHO MV E DECELERATION TIME (DT): 185.2 MS
ECHO MV E POINT SEPTAL SEPARATION (EPSS): 11.5 CM
ECHO MV E VELOCITY: 0.67 CM/S
ECHO MV E/A RATIO: 0.02
ECHO MV PRESSURE HALF TIME (PHT): 53.7 MS
ECHO MV REGURGITANT PEAK GRADIENT: 74 MMHG
ECHO MV REGURGITANT PEAK VELOCITY: 430.09 CM/S
ECHO PULMONARY ARTERY SYSTOLIC PRESSURE (PASP): 34.7 MMHG
ECHO PV MAX VELOCITY: 66.46 CM/S
ECHO PV PEAK GRADIENT: 1.8 MMHG
ECHO RIGHT VENTRICULAR SYSTOLIC PRESSURE (RVSP): 34.7 MMHG
ECHO RV INTERNAL DIMENSION: 3.74 CM
ECHO TV REGURGITANT MAX VELOCITY: 248.4 CM/S
ECHO TV REGURGITANT PEAK GRADIENT: 24.7 MMHG
FERRITIN SERPL-MCNC: 74 NG/ML (ref 26–388)
GLUCOSE SERPL-MCNC: 104 MG/DL (ref 65–100)
P-R INTERVAL, ECG05: 144 MS
POTASSIUM SERPL-SCNC: 3.9 MMOL/L (ref 3.5–5.1)
Q-T INTERVAL, ECG07: 426 MS
QRS DURATION, ECG06: 86 MS
QTC CALCULATION (BEZET), ECG08: 509 MS
SAMPLES BEING HELD,HOLD: NORMAL
SODIUM SERPL-SCNC: 138 MMOL/L (ref 136–145)
TROPONIN I SERPL-MCNC: 2.75 NG/ML
TSH SERPL DL<=0.05 MIU/L-ACNC: 2.82 UIU/ML (ref 0.36–3.74)
VENTRICULAR RATE, ECG03: 86 BPM

## 2019-02-04 PROCEDURE — 84443 ASSAY THYROID STIM HORMONE: CPT

## 2019-02-04 PROCEDURE — 74011250637 HC RX REV CODE- 250/637: Performed by: INTERNAL MEDICINE

## 2019-02-04 PROCEDURE — 74011250636 HC RX REV CODE- 250/636: Performed by: INTERNAL MEDICINE

## 2019-02-04 PROCEDURE — 94760 N-INVAS EAR/PLS OXIMETRY 1: CPT

## 2019-02-04 PROCEDURE — 93306 TTE W/DOPPLER COMPLETE: CPT

## 2019-02-04 PROCEDURE — 36415 COLL VENOUS BLD VENIPUNCTURE: CPT

## 2019-02-04 PROCEDURE — 94762 N-INVAS EAR/PLS OXIMTRY CONT: CPT

## 2019-02-04 PROCEDURE — 84484 ASSAY OF TROPONIN QUANT: CPT

## 2019-02-04 PROCEDURE — 80048 BASIC METABOLIC PNL TOTAL CA: CPT

## 2019-02-04 PROCEDURE — 93005 ELECTROCARDIOGRAM TRACING: CPT

## 2019-02-04 PROCEDURE — 65660000000 HC RM CCU STEPDOWN

## 2019-02-04 PROCEDURE — 82728 ASSAY OF FERRITIN: CPT

## 2019-02-04 RX ORDER — CARVEDILOL 3.12 MG/1
3.12 TABLET ORAL 2 TIMES DAILY WITH MEALS
Status: DISCONTINUED | OUTPATIENT
Start: 2019-02-04 | End: 2019-02-05

## 2019-02-04 RX ORDER — POTASSIUM CHLORIDE 20 MEQ/1
20 TABLET, EXTENDED RELEASE ORAL 2 TIMES DAILY
Status: DISCONTINUED | OUTPATIENT
Start: 2019-02-04 | End: 2019-02-05

## 2019-02-04 RX ORDER — ACETAMINOPHEN 325 MG/1
650 TABLET ORAL
Status: DISCONTINUED | OUTPATIENT
Start: 2019-02-04 | End: 2019-02-05 | Stop reason: HOSPADM

## 2019-02-04 RX ORDER — ENOXAPARIN SODIUM 100 MG/ML
40 INJECTION SUBCUTANEOUS 2 TIMES DAILY
Status: DISCONTINUED | OUTPATIENT
Start: 2019-02-04 | End: 2019-02-05

## 2019-02-04 RX ADMIN — ACETAMINOPHEN 650 MG: 325 TABLET ORAL at 03:53

## 2019-02-04 RX ADMIN — LISINOPRIL 20 MG: 20 TABLET ORAL at 08:43

## 2019-02-04 RX ADMIN — CARVEDILOL 3.12 MG: 3.12 TABLET, FILM COATED ORAL at 18:32

## 2019-02-04 RX ADMIN — HYDRALAZINE HYDROCHLORIDE 10 MG: 20 INJECTION INTRAMUSCULAR; INTRAVENOUS at 03:28

## 2019-02-04 RX ADMIN — POTASSIUM CHLORIDE 20 MEQ: 20 TABLET, EXTENDED RELEASE ORAL at 18:31

## 2019-02-04 RX ADMIN — FUROSEMIDE 40 MG: 10 INJECTION, SOLUTION INTRAMUSCULAR; INTRAVENOUS at 18:31

## 2019-02-04 RX ADMIN — CARVEDILOL 3.12 MG: 3.12 TABLET, FILM COATED ORAL at 08:42

## 2019-02-04 RX ADMIN — ACETAMINOPHEN 650 MG: 325 TABLET ORAL at 08:42

## 2019-02-04 RX ADMIN — POTASSIUM CHLORIDE 20 MEQ: 20 TABLET, EXTENDED RELEASE ORAL at 08:42

## 2019-02-04 RX ADMIN — ENOXAPARIN SODIUM 40 MG: 40 INJECTION SUBCUTANEOUS at 21:01

## 2019-02-04 RX ADMIN — FUROSEMIDE 40 MG: 10 INJECTION, SOLUTION INTRAMUSCULAR; INTRAVENOUS at 08:42

## 2019-02-04 NOTE — PROGRESS NOTES
Cardiology Progress Note  2019     Admit Date: 2/3/2019  Admit Diagnosis: Cardiomyopathy [facility])  CC: none currently  Cardiac Assessment/Plan:   CHF: probable acute sys/diast CHF; Echo pending; Likely related to HTN; Nl TSH/ferritin; needs HIV testing;   Cont IV lasix BID; add KCl; Cont lisinopril 20. Change toprol XL to coreg 3.125 bid. Elevated troponin: likely related to CM; will see; may need outpt MPI; no angina. ECG c/w LVH/repolarization changes. HTN: as above  Rhythm: sinus  Renal function: stable so far. ?ELE: needs overnight pulse Ox; may need formal outpt SS. Lipids: Check while here    Dispo: ? D/C tomorrow if stable. For other plans, see orders.   Hospital problem list   Active Hospital Problems    Diagnosis Date Noted    Cardiomyopathy [facility]) 2019        Subjective: Mustapha Pace reports   Chest pain X none  consistent with:  Non-cardiac CP         Atypical CP     None now  On going  Anginal CP     Dyspnea X none  at rest  with exertion         improved  unchanged  worse              PND X none  overnight       Orthopnea X none  improved  unchanged  worse   Presyncope X none  improved  unchanged  worse     Ambulated in hallway without symptoms   Yes   Ambulated in room without symptoms  Yes   Objective:    Physical Exam:  Overall VSSAF;    Visit Vitals  BP (!) 154/113 (BP 1 Location: Left arm, BP Patient Position: Sitting) Comment: RN notified   Pulse 80   Temp 97.7 °F (36.5 °C)   Resp 20   Wt 139.3 kg (307 lb 1.6 oz)   SpO2 95%   BMI 40.52 kg/m²     Temp (24hrs), Av.1 °F (36.7 °C), Min:97.3 °F (36.3 °C), Max:98.6 °F (37 °C)    Patient Vitals for the past 8 hrs:   Pulse   19 0752 80   19 0258 83     Patient Vitals for the past 8 hrs:   Resp   19 0752 20   19 0258 20     Patient Vitals for the past 8 hrs:   BP   19 0752 (!) 154/113   19 0509 (!) 167/108   19 0258 (!) 166/124       Intake/Output Summary (Last 24 hours) at 2/4/2019 2134  Last data filed at 2/4/2019 0437  Gross per 24 hour   Intake 500 ml   Output 750 ml   Net -250 ml     General Appearance: Well developed, well nourished, no acute distress. Ears/Nose/Mouth/Throat:   Normal MM; anicteric. JVP: WNL   Resp:   clear to auscultation bilaterally. Nl resp effort. Cardiovascular:  RRR, S1, S2 normal, no new murmur. No gallop or rub. Abdomen:   Soft, non-tender, bowel sounds are present. Extremities: 2+ edema bilaterally. Skin:  Neuro: Warm and dry. A/O x3, grossly nonfocal   cath site intact w/o hematoma or new bruit; distal pulse unchanged  Yes   Data Review:     Telemetry independently reviewed x sinus  chronic afib  parox afib  NSVT     ECG independently reviewed x NSR  afib x LVH; STT changes  NSST-Tw chgs    no new ECG provided for review   Lab results reviewed as noted below. Current medications reviewed as noted below. No results for input(s): PH, PCO2, PO2 in the last 72 hours. Recent Labs     02/04/19  0354 02/03/19  1814 02/03/19  1236   TROIQ 2.75* 2.62* 2.31*     Recent Labs     02/04/19  0354 02/03/19  1236    143   K 3.9 4.1    108   CO2 25 30   BUN 15 17   CREA 1.11 1.32*   GFRAA >60 >60   * 93   CA 8.1* 7.9*   ALB  --  2.4*   WBC  --  13.3*   HGB  --  15.0   HCT  --  46.6   PLT  --  353     No results found for: CHOL, CHOLX, CHLST, CHOLV, HDL, LDL, LDLC, DLDLP, TGLX, TRIGL, TRIGP, CHHD, CHHDX  Recent Labs     02/03/19  1236   SGOT 38*      TP 6.3*   ALB 2.4*   GLOB 3.9     No results for input(s): INR, PTP, APTT in the last 72 hours.     No lab exists for component: INREXT   No components found for: GLPOC    Current Facility-Administered Medications   Medication Dose Route Frequency    acetaminophen (TYLENOL) tablet 650 mg  650 mg Oral Q4H PRN    carvedilol (COREG) tablet 3.125 mg  3.125 mg Oral BID WITH MEALS    lisinopril (PRINIVIL, ZESTRIL) tablet 20 mg  20 mg Oral DAILY    furosemide (LASIX) injection 40 mg  40 mg IntraVENous BID    ondansetron (ZOFRAN) injection 4 mg  4 mg IntraVENous Q8H PRN    hydrALAZINE (APRESOLINE) 20 mg/mL injection 10 mg  10 mg IntraVENous Q6H PRN        Oswaldo Campos MD

## 2019-02-04 NOTE — ED NOTES
Spoke with Dr. Alice Mackay regarding critical troponin. States he does not wish to be called for critical troponin's unless level is greater than 5. Also spoke with him regarding Bps and states he will put something in PRN for BP.

## 2019-02-04 NOTE — PROGRESS NOTES
Pharmacy - Enoxaparin (Lovenox®) Monitoring      Indication: DVT prophylaxis     Current Dose: Enoxaparin 40 mg subcutaneously every 12 hours    Creatinine Clearance (mL/min): > 100 ml/min    Current Weight: 142.9 Kg    Labs:  Recent Labs     02/04/19  0354 02/03/19  1236   CREA 1.11 1.32*   HGB  --  15.0   PLT  --  353     Wt Readings from Last 1 Encounters:   02/04/19 142.9 kg (315 lb)     Ht Readings from Last 1 Encounters:   02/04/19 185.4 cm (73\")       Impression/Plan:   · 40 mg daily ordered, dose adjusted to 40 mg q12h due to BMI > 40 according to protocol     Margarito Roberts, [facility]

## 2019-02-04 NOTE — PROGRESS NOTES
Problem: Hypertension  Goal: *Blood pressure within specified parameters  Outcome: Progressing Towards Goal  Patient will maintain BP controlled at/ near optimal readings. SYS:120/ EVELYN: 80.  Patient will remain free of dizziness, sitting at edge of bed, before standing.

## 2019-02-04 NOTE — PROGRESS NOTES
1910-Received bedside report from [address] Patient arrived to [facility] via Wheelchair. AOx4, no complaints of pain, no CP. Skin in tact. Bedside shift change report given to , RN (oncoming nurse). Report included the following information SBAR, Kardex and ED Summary. SHIFT SUMMARY:            [facility] NURSING NOTE   Admission Date 2/3/2019   Admission Diagnosis Cardiomyopathy ([address]   Consults IP CONSULT TO CARDIOLOGY      Cardiac Monitoring [x] Yes [] No      Purposeful Hourly Rounding [x] Yes    Wiley Score Total Score: 0   Wiley score 3 or > [] Bed Alarm [] Avasys [] 1:1 sitter [] Patient refused (Signed refusal form in chart)   Karthikeyan Score Karthikeyan Score: 22   Karthikeyan score 14 or < [] PMT consult [] Wound Care consult    []  Specialty bed  [] Nutrition consult      Influenza Vaccine Received Flu Vaccine for Current Season (usually Sept-March): No    Patient/Guardian Refused (Notify MD): Yes      Oxygen needs? [x] Room air Oxygen @  []1L    []2L    []3L   []4L    []5L   []6L via  NC   Chronic home O2 use? [] Yes [x] No  Perform O2 challenge test and document in progress note using smartphrase (.Homeoxygen)      Last bowel movement        Urinary Catheter             LDAs               Peripheral IV 02/03/19 Left Antecubital (Active)   Site Assessment Clean, dry, & intact 2/3/2019  9:30 PM   Phlebitis Assessment 0 2/3/2019  9:30 PM   Infiltration Assessment 0 2/3/2019  9:30 PM   Dressing Status Clean, dry, & intact 2/3/2019  9:30 PM   Dressing Type Transparent 2/3/2019  9:30 PM   Hub Color/Line Status Flushed;Pink 2/3/2019  9:30 PM   Action Taken Blood drawn 2/3/2019 12:42 PM                         Readmission Risk Assessment Tool Score Low Risk            0       Total Score            Criteria that do not apply:    Has Seen PCP in Last 6 Months (Yes=3, No=0)    . Living with Significant Other. Assisted Living. LTAC. SNF.  or   Rehab    Patient Length of Stay (>5 days = 3)    IP Visits Last 12 Months (1-3=4, 4=9, >4=11)    Pt.  Coverage (Medicare=5 , Medicaid, or Self-Pay=4)    Charlson Comorbidity Score (Age + Comorbid Conditions)       Expected Length of Stay - - -   Actual Length of Stay 1

## 2019-02-04 NOTE — H&P
[address] HISTORY AND PHYSICAL Shaggy Tsai 
MR#: [id number] : 1980 ACCOUNT #: [de-identified] ADMIT DATE: 2019 CHIEF COMPLAINT:  Cough, especially when lying down. HISTORY OF PRESENT ILLNESS:  This is a 60-year-old male with a history of hypertensive heart disease, off of antihypertensive medication for about a year, who came to the emergency room with heart failure symptoms. He had not been able to afford his antihypertensive regimen due to cost (and losing insurance), so his blood pressure admittedly has been running high. He now has a job that has insurance. Over the last month, he has noted progressive worsening of a nocturnal cough that is nonproductive. Anytime he lies down in fact, that is when the cough gets worse. Over the last week in particular, he has had significant pedal edema. This has been enough for him to seek medical care. From the coughing which has been vigorous at times, he has had some bilateral upper abdominal pain. No noy chest, jaw, neck, shoulder, or arm discomfort. No syncope, dizziness, or palpitations. No TIA or stroke-like symptoms. ALLERGIES:  NO KNOWN DRUG ALLERGIES. MEDICATIONS:  None. He had previously been on losartan/hydrochlorothiazide 100/25 mg daily, as well as amlodipine 10 mg daily. FAMILY HISTORY:  Noncontributory. SOCIAL HISTORY:  Former smoker. Recreational alcohol use. REVIEW OF SYSTEMS:  Complete review of systems was obtained from the patient. Except as noted above and below, it is otherwise noncontributory across 13 body systems. Please also see the documented review of systems from the emergency room for comparison. PHYSICAL EXAMINATION: 
VITAL SIGNS:  Temperature 97.3 Fahrenheit, pulse 86, blood pressure 179/129, respirations 22, oxygen saturation 97% on 2 liters by nasal cannula. GENERAL:  Not diaphoretic, not in acute distress. NECK:  Supple. No palpable thyromegaly. Nontender. HEENT:  No scleral icterus. Mucous membranes are moist.  Conjunctivae are pink. No xanthelasma. RESPIRATORY:  Unlabored. Clear to auscultation bilaterally. Symmetric air movement. CARDIAC:  Regular rate and rhythm. I do not hear a significant murmur or rub. Interestingly, I do not hear a gallop either. Palpable radial pulses bilaterally. ABDOMEN:  Obese. Soft, nontender, nondistended. EXTREMITIES:  Without cyanosis or clubbing. No amputations. NEUROLOGIC:  Unremarkable. Awake, appropriate, grossly nonfocal.  No resting tremor. SKIN:  Without rash, jaundice, petechia, or purpura. MUSCULOSKELETAL:  Unremarkable . Normal bulk and tone. TELEMETRY:  Sinus rhythm. ELECTROCARDIOGRAM:  Sinus tachycardia at a rate of 112 beats per minute. The axis and intervals are normal.  There is a nonspecific repolarization abnormality. CHEST X-RAY:  The study in the emergency room shows a significantly enlarged cardiac silhouette. There is no noy pulmonary edema. A large pericardial effusion cannot be completely excluded. LABORATORY DATA:  These were reviewed. Findings were remarkable for a CK of 235, troponin of 2.31, and a proBNP of 7481. The white blood cell count was 13.3, hemoglobin 15.0, platelets 988. IMPRESSION: 
1. Likely a newly diagnosed and likely nonischemic cardiomyopathy. 2.  Mild acute heart failure symptoms, likely systolic. 3.  Obesity. 4.  Hypertensive heart disease with a likely heart failure. There is no evidence of chronic kidney disease. 5.  Cough, likely due to the above. RECOMMENDATIONS: 
1. He needs an echo. 2.  We will initiate lisinopril. 3.  I will start metoprolol extended release. 4.  We will continue with intravenous diuresis. He did get a dose in the emergency room which has helped. 5.  Further evaluation and treatment pending his course. MD ZACHARY BARKER/JEF 
 D: 02/03/2019 17:51    
T: 02/03/2019 20:04 JOB #: [id number]

## 2019-02-04 NOTE — PROGRESS NOTES
Bedside and Verbal shift change report given to Nori Vidal RN (oncoming nurse). Report included the following information SBAR, Kardex, ED Summary, Procedure Summary, Intake/Output, MAR and Recent Results. SHIFT SUMMARY:  3 pm: ECHO completed. 623: mart, pharmacist states lovenox 40mg daily was changed by them per protocol to 2x/d based on pt's BMI. Overnight oximetry ordered for pt.

## 2019-02-05 VITALS
WEIGHT: 296.5 LBS | SYSTOLIC BLOOD PRESSURE: 158 MMHG | RESPIRATION RATE: 20 BRPM | TEMPERATURE: 98.1 F | DIASTOLIC BLOOD PRESSURE: 103 MMHG | BODY MASS INDEX: 39.3 KG/M2 | OXYGEN SATURATION: 95 % | HEIGHT: 73 IN | HEART RATE: 84 BPM

## 2019-02-05 LAB
ANION GAP SERPL CALC-SCNC: 5 MMOL/L (ref 5–15)
BUN SERPL-MCNC: 15 MG/DL (ref 6–20)
BUN/CREAT SERPL: 11 (ref 12–20)
CALCIUM SERPL-MCNC: 8.2 MG/DL (ref 8.5–10.1)
CHLORIDE SERPL-SCNC: 105 MMOL/L (ref 97–108)
CHOLEST SERPL-MCNC: 160 MG/DL
CO2 SERPL-SCNC: 29 MMOL/L (ref 21–32)
CREAT SERPL-MCNC: 1.31 MG/DL (ref 0.7–1.3)
EST. AVERAGE GLUCOSE BLD GHB EST-MCNC: 117 MG/DL
GLUCOSE SERPL-MCNC: 83 MG/DL (ref 65–100)
HBA1C MFR BLD: 5.7 % (ref 4.2–6.3)
HDLC SERPL-MCNC: 36 MG/DL
HDLC SERPL: 4.4 {RATIO} (ref 0–5)
HIV 1+2 AB+HIV1 P24 AG SERPL QL IA: NONREACTIVE
HIV12 RESULT COMMENT, HHIVC: NORMAL
LDLC SERPL CALC-MCNC: 107 MG/DL (ref 0–100)
LIPID PROFILE,FLP: ABNORMAL
POTASSIUM SERPL-SCNC: 4.1 MMOL/L (ref 3.5–5.1)
SODIUM SERPL-SCNC: 139 MMOL/L (ref 136–145)
TRIGL SERPL-MCNC: 85 MG/DL (ref ?–150)
VLDLC SERPL CALC-MCNC: 17 MG/DL

## 2019-02-05 PROCEDURE — 94762 N-INVAS EAR/PLS OXIMTRY CONT: CPT

## 2019-02-05 PROCEDURE — 36415 COLL VENOUS BLD VENIPUNCTURE: CPT

## 2019-02-05 PROCEDURE — 74011250637 HC RX REV CODE- 250/637: Performed by: INTERNAL MEDICINE

## 2019-02-05 PROCEDURE — 87389 HIV-1 AG W/HIV-1&-2 AB AG IA: CPT

## 2019-02-05 PROCEDURE — 83036 HEMOGLOBIN GLYCOSYLATED A1C: CPT

## 2019-02-05 PROCEDURE — 80048 BASIC METABOLIC PNL TOTAL CA: CPT

## 2019-02-05 PROCEDURE — 80061 LIPID PANEL: CPT

## 2019-02-05 PROCEDURE — 74011250636 HC RX REV CODE- 250/636: Performed by: INTERNAL MEDICINE

## 2019-02-05 RX ORDER — LISINOPRIL 20 MG/1
20 TABLET ORAL DAILY
Qty: 30 TAB | Refills: 12 | Status: SHIPPED | OUTPATIENT
Start: 2019-02-05 | End: 2022-09-30

## 2019-02-05 RX ORDER — CARVEDILOL 6.25 MG/1
6.25 TABLET ORAL 2 TIMES DAILY WITH MEALS
Status: DISCONTINUED | OUTPATIENT
Start: 2019-02-05 | End: 2019-02-05 | Stop reason: HOSPADM

## 2019-02-05 RX ORDER — POTASSIUM CHLORIDE 750 MG/1
10 TABLET, FILM COATED, EXTENDED RELEASE ORAL DAILY
Qty: 30 TAB | Refills: 12 | Status: SHIPPED | OUTPATIENT
Start: 2019-02-05 | End: 2022-09-26

## 2019-02-05 RX ORDER — POTASSIUM CHLORIDE 750 MG/1
10 TABLET, FILM COATED, EXTENDED RELEASE ORAL DAILY
Status: DISCONTINUED | OUTPATIENT
Start: 2019-02-05 | End: 2019-02-05 | Stop reason: HOSPADM

## 2019-02-05 RX ORDER — CARVEDILOL 6.25 MG/1
6.25 TABLET ORAL 2 TIMES DAILY WITH MEALS
Qty: 60 TAB | Refills: 12 | Status: ON HOLD | OUTPATIENT
Start: 2019-02-05 | End: 2022-09-30 | Stop reason: SDUPTHER

## 2019-02-05 RX ORDER — ASPIRIN 81 MG/1
81 TABLET ORAL DAILY
Status: DISCONTINUED | OUTPATIENT
Start: 2019-02-05 | End: 2019-02-05 | Stop reason: HOSPADM

## 2019-02-05 RX ORDER — ASPIRIN 81 MG/1
81 TABLET ORAL DAILY
Qty: 30 TAB | Refills: 12 | Status: SHIPPED
Start: 2019-02-05

## 2019-02-05 RX ORDER — FUROSEMIDE 40 MG/1
40 TABLET ORAL DAILY
Status: DISCONTINUED | OUTPATIENT
Start: 2019-02-05 | End: 2019-02-05 | Stop reason: HOSPADM

## 2019-02-05 RX ORDER — FUROSEMIDE 40 MG/1
TABLET ORAL
Qty: 30 TAB | Refills: 12 | Status: SHIPPED | OUTPATIENT
Start: 2019-02-06 | End: 2022-09-26

## 2019-02-05 RX ADMIN — FUROSEMIDE 40 MG: 40 TABLET ORAL at 10:03

## 2019-02-05 RX ADMIN — CARVEDILOL 6.25 MG: 6.25 TABLET, FILM COATED ORAL at 10:07

## 2019-02-05 RX ADMIN — ASPIRIN 81 MG: 81 TABLET, COATED ORAL at 10:02

## 2019-02-05 RX ADMIN — POTASSIUM CHLORIDE 10 MEQ: 750 TABLET, EXTENDED RELEASE ORAL at 10:02

## 2019-02-05 RX ADMIN — HYDRALAZINE HYDROCHLORIDE 10 MG: 20 INJECTION INTRAMUSCULAR; INTRAVENOUS at 03:57

## 2019-02-05 RX ADMIN — LISINOPRIL 20 MG: 20 TABLET ORAL at 10:06

## 2019-02-05 NOTE — PROGRESS NOTES
Reason for Admission: NSTEMI                     RRAT Score:  0                    Plan for utilizing home health:  None. Likelihood of Readmission: Low                          Transition of Care Plan:  CM met with pt at bedside to introduce role, verify demographics, and discussed discharge planning. Pt was A/Ox4. Pt will d/c home today, transported by his mother-in-law. Per pt, he and his 6year old son are temporarily living at the [employer] [facility] at [address], [address] They have lived there for approximately 2 months and pt plans to transition to an apartment once he receives his tax return. Pt previously lived with his ex-wife and mother-in-law. Pt works for [employer], approx. 6 days per week and 8-10 hrs per day. He has Jongla and reports no concerns for coverage or finances in general. Pt does not have a personal vehicle at this time although he has access to a vehicle through his work Mon-Fri. Pt receives rides from his friends when needed. He is followed by Dr. Michele Rodriguez for PCP services although he reports he has not seen her in a while. Pt did not want CM to schedule a f/u appt therefore he was encouraged to make an appt as soon as possible. Pt agreed and also stated that he plans to attend Cardiac Rehab which was recommended. No other concerns indicated at this time. Care Management Interventions  PCP Verified by CM: Yes(Janiya South [phone])  Mode of Transport at Discharge: Other (see comment)(mother-in law)  Transition of Care Consult (CM Consult): Other(Home with f/u services)  MyChart Signup: No  Discharge Durable Medical Equipment: No  Physical Therapy Consult: No  Occupational Therapy Consult: No  Speech Therapy Consult: No  Current Support Network:  Other(temporarily lives with son in hotel room)  Confirm Follow Up Transport: Friends  Plan discussed with Pt/Family/Caregiver: Yes(disucssed with pt at bedside)  Discharge Location  Discharge Placement: [id number] [place] [address], JOHN PAUL, [facility]  [facility]  [phone]

## 2019-02-05 NOTE — DISCHARGE INSTRUCTIONS
[address], [address]    [phone]  [place], [address] [place] [address]    [email or web address]    Patient Discharge Instructions    Hayden Gamboa / [id number] : 1980    Admitted 2/3/2019 Discharged 2019     · It is important that you take the medication exactly as they are prescribed. · Keep your medication in the bottles provided by the pharmacist and keep a list of the medication names, dosages, and times to be taken in your wallet. · Do not take other medications without consulting your doctor. BRING ALL OF YOUR MEDICINES or a list of medicines with dosages TO YOUR OFFICE VISIT with Dr. Sabi German. What to do at Home    Recommended activity: Activity as tolerated, Light duty at work starting 19. Follow-up:   Follow-up Information     Follow up With Specialties Details Why Contact Info    Maco Hernandez, [address] [address] [facility]  [phone]      Dillan Young MD Cardiology Schedule an appointment as soon as possible for a visit in 1 week  [address] [address] [phone]      [facility] Heart Failure Nurse ([phone]) will be calling you in the next 1-2 days to see how you are doing and to set up an office visit with Dr Sabi German within the next 7 days from now. [facility].  Call [phone]; To have a sleep study set up (to evaluate and treat sleep apnea) [address]  [place]  [place] next week to watch kidney function  On 2019            Heart Failure: After Your Visit  Your Care Instructions  Heart failure occurs when your heart does not pump as much blood as the body needs. Failure does not mean that the heart has stopped pumping but rather that it is not pumping as well as it should. Over time, this causes fluid buildup in your lungs and other parts of your body.  Fluid buildup can cause shortness of breath, fatigue, swollen ankles, and other problems. By taking medicines regularly, reducing sodium (salt) in your diet, checking your weight every day, and making lifestyle changes, you can feel better and live longer. Follow-up care is a key part of your treatment and safety. Be sure to make and go to all appointments, and call your doctor if you are having problems. You need to keep a list of the medicines you take and the medicine dosages. How can you care for yourself at home? Diet  · Limit sodium (salt) in your diet to less than 1800 mg per day. People get most of their sodium from table salt that is added to food and from processed foods. Fast food and restaurant meals also tend to be very high in sodium. Do not add salt to your food. · Limit your liquids to  1.5 quarts per day. Medicines  · Take your medicines exactly as prescribed. Call your doctor if you think you are having a problem with your medicine. · Do not take any vitamins, over-the-counter medicine, or herbal products without talking to your doctor first. Nathaly Choi not take ibuprofen (Advil or Motrin) and naproxen (Aleve) without talking to your doctor first. They could make your heart failure worse. · You may be taking some of the following medicine. ¨ Beta-blockers can slow heart rate, decrease blood pressure, and improve your condition. Taking a beta-blocker may lower your chance of needing to be hospitalized again. ¨ Angiotensin-converting enzyme inhibitors (ACEIs) reduce the heart's workload, lower blood pressure, and reduce swelling. Taking an ACEI may lower your chance of needing to be hospitalized again. ¨ Angiotensin II receptor blockers (ARBs) work like ACEIs. Your doctor may prescribe them instead of or in addition to ACEIs. ¨ Diuretics, also called water pills, reduce swelling. ¨ Spironolactone is a diuretic that also keeps heart failure from getting worse. ¨ Digoxin reduces symptoms for some people with heart failure.   ¨ Aspirin and other blood thinners prevent blood clots, which can cause a stroke or heart attack. ¨ Potassium supplements replace this important mineral, which is sometimes lost with diuretics. When should you call for help? Call 911 if you have symptoms of sudden heart failure such as:  · You have severe trouble breathing. · You cough up pink, foamy mucus. · You have a new irregular or rapid heartbeat. Call your doctor now or seek immediate medical care if:  · You have new or increased shortness of breath. · You are dizzy or lightheaded, or you feel like you may faint. · You have sudden weight gain, such as 3 pounds or more in 2 to 3 days. · You have increased swelling in your legs, ankles, or feet. · You are suddenly so tired or weak that you cannot do your usual activities. Watch closely for changes in your health, and be sure to contact your doctor if:  · You develop new symptoms. ·     Lifestyle changes  Do not smoke. Smoking increases your risk of a heart attack. If you need help quitting, talk to your doctor about stop-smoking programs and medicines. These can increase your chances of quitting for good. Eat a heart-healthy diet that is low in cholesterol, saturated fat, and salt, and is full of fruits, vegetables and whole-grains. Eat at least two servings of fish each week. You may get more details about how to eat healthy, but these tips can help you get started. Avoid colds and flu. Get a pneumococcal vaccine shot. If you have had one before, ask your doctor whether you need a second dose. Get a flu shot every fall. If you must be around people with colds or flu, wash your hands often. Watch closely for changes in your health, and be sure to contact your doctor if you have any problem      If you are smoking, please stop. Smoking Cessation Program is a free, phone/text/email based, smoking cessation program. The program is individualized to meet each patient's needs.  To enroll use this link [email or web address]      Information obtained by :  I understand that if any problems occur once I am at home I am to contact my physician. I understand and acknowledge receipt of the instructions indicated above. R.N.'s Signature                                                                  Date/Time                                                                                                                                              Patient or Representative Signature                                                          Date/Time      Colt Brady MD         [address]    [phone]  [address]    [email or web address]        Smoking Cessation Program:   This is a free,  phone/text/email based, smoking cessation program. The program is individualized to meet each patient's needs. To enroll use this link [email or web address]

## 2019-02-05 NOTE — DISCHARGE SUMMARY
Cardiology Discharge Summary     Patient ID: Tonya Gomez  [id number]  22 y.o.  1980    Admit Date: 2/3/2019  Discharge Date: 2/5/2019   Admitting Physician: Jesusita Mayfield MD   Discharge Physician: Cecillia Bumpers, MD    Admission Diagnoses: Cardiomyopathy [facility])    Discharge Diagnoses: Active Problems:    Cardiomyopathy ([address] (2/3/2019)    HTN CM  Obesity; morbid  Probable ELE  Elevated troponin    Cardiology Procedures this Admission:  EchoCardiogram    Hospital Course:    CHF: acute sys/diast CHF; Echo 2/5: Ef 20-25%, Mild MR; Mild-mod TR; PAp 35.; Likely related to HTN; Nl TSH/ferritin; Neg HIV; No desats with ambulation. Cont lisinopril 20. Increase coreg to 6.25 bid; Lasix to 40 PO qday/KCL 10; (eventual aldactone if Cr ok as outpt). Wt from 315 to 296.      Elevated troponin: likely related to CM; Cath if EF not improved with med Rx; if improves, outpt MPI; no angina. ECG c/w LVH/repolarization changes.     HTN: as above  Rhythm: sinus  Renal function: Cr 1.3; GFR >60.     ?ELE: + overnight pulse Ox; needs formal outpt SS. Lipids:   Obesity      Recent Labs     02/05/19  0303 02/04/19  0354 02/03/19  1236    138 143   K 4.1 3.9 4.1   BUN 15 15 17   CREA 1.31* 1.11 1.32*   WBC  --   --  13.3*   HGB  --   --  15.0   HCT  --   --  46.6   PLT  --   --  353     Lab Results   Component Value Date/Time    Cholesterol, total 160 02/05/2019 03:03 AM    HDL Cholesterol 36 02/05/2019 03:03 AM    LDL, calculated 107 (H) 02/05/2019 03:03 AM    Triglyceride 85 02/05/2019 03:03 AM     Recent Labs     02/03/19  1236   SGOT 38*   ALT 39        No results for input(s): INR, PTP, APTT in the last 72 hours. No lab exists for component: INREXT   Patient was ambulating without symptoms prior to d/c.   Consults: None  Disposition: home  Discharge Condition: Stable  Patient Instructions:   Current Discharge Medication List      START taking these medications    Details   aspirin delayed-release 81 mg tablet Take 1 Tab by mouth daily. Qty: 30 Tab, Refills: 12      carvedilol (COREG) 6.25 mg tablet Take 1 Tab by mouth two (2) times daily (with meals). Qty: 60 Tab, Refills: 12      furosemide (LASIX) 40 mg tablet 30  Qty: 30 Tab, Refills: 12      lisinopril (PRINIVIL, ZESTRIL) 20 mg tablet Take 1 Tab by mouth daily. Qty: 30 Tab, Refills: 12      potassium chloride SR (KLOR-CON 10) 10 mEq tablet Take 1 Tab by mouth daily. Qty: 30 Tab, Refills: 12         STOP taking these medications       losartan-hydrochlorothiazide (HYZAAR) 100-25 mg per tablet Comments:   Reason for Stopping:         amlodipine (NORVASC) 10 mg tablet Comments:   Reason for Stopping:               Referenced discharge instructions provided by nursing for diet and activity. Follow-up:   Follow-up Information     Follow up With Specialties Details Why Contact Info    [facility] [place], [address] [address]  [phone]      Jeanine Markham MD Cardiology Schedule an appointment as soon as possible for a visit in 1 week  [address] [address] [phone]      [facility] Heart Failure Nurse ([phone]) will be calling you in the next 1-2 days to see how you are doing and to set up an office visit with Dr Myles Younger within the next 7 days from now. [facility].  Call [phone];  To have a sleep study set up (to evaluate and treat sleep apnea) [address] [facility] next week to watch kidney function  On 2/11/2019            Signed:  Carter Marino MD  2/5/2019  9:33 AM

## 2019-02-05 NOTE — PROGRESS NOTES
1910-Bedside report received from [facility]    2000-Overnight Pulse Oximetry in place, patient resting comfortably. Will continue to monitor. Maryellen Black

## 2019-02-05 NOTE — PROGRESS NOTES
Cardiology Progress Note  2019     Admit Date: 2/3/2019  Admit Diagnosis: Cardiomyopathy ([facility] [address]  CC: none currently  Cardiac Assessment/Plan:   CHF: acute sys/diast CHF; Echo : Ef 20-25%; Likely related to HTN; Nl TSH/ferritin; Neg HIV; No desats with ambulation. Cont lisinopril 20. Increase coreg to 6.25 bid; Lasix to 40 PO qday/KCL 10; (eventual aldactone if Cr ok as outpt). Elevated troponin: likely related to CM; Cath if EF not improved with med Rx; if improves, outpt MPI; no angina. ECG c/w LVH/repolarization changes. HTN: as above  Rhythm: sinus  Renal function: Cr 1.3    ? ELE: + overnight pulse Ox; needs formal outpt SS. Lipids:   Obesity    Dispo: D/C today; light duty starting  (note done). For other plans, see orders.   Hospital problem list   Active Hospital Problems    Diagnosis Date Noted    Cardiomyopathy [facility]) 2019        Subjective: Roxie Curran reports   Chest pain X none  consistent with:  Non-cardiac CP         Atypical CP     None now  On going  Anginal CP     Dyspnea X none  at rest  with exertion         improved  unchanged  worse              PND X none  overnight       Orthopnea X none  improved  unchanged  worse   Presyncope X none  improved  unchanged  worse     Ambulated in hallway without symptoms  x Yes   Ambulated in room without symptoms x Yes   Objective:    Physical Exam:  Overall VSSAF;    Visit Vitals  BP (!) 159/117 (BP 1 Location: Left arm, BP Patient Position: Sitting)   Pulse 68   Temp 97.8 °F (36.6 °C)   Resp 20   Ht 6' 1\" (1.854 m)   Wt 134.5 kg (296 lb 8 oz)   SpO2 99%   BMI 39.12 kg/m²     Temp (24hrs), Av.7 °F (36.5 °C), Min:97.6 °F (36.4 °C), Max:97.8 °F (36.6 °C)    Patient Vitals for the past 8 hrs:   Pulse   19 0755 68   19 0336 82     Patient Vitals for the past 8 hrs:   Resp   19 0755 20   19 0336 18     Patient Vitals for the past 8 hrs:   BP   19 0755 (!) 159/117   19 0554 (!) 160/103   02/05/19 0336 (!) 158/103       Intake/Output Summary (Last 24 hours) at 2/5/2019 0915  Last data filed at 2/5/2019 0138  Gross per 24 hour   Intake 240 ml   Output --   Net 240 ml     General Appearance: Well developed, obese, no acute distress. Ears/Nose/Mouth/Throat:   Normal MM; anicteric. JVP: WNL   Resp:   clear to auscultation bilaterally. Nl resp effort. Cardiovascular:  RRR, S1, S2 normal, no new murmur. No gallop or rub. Abdomen:   Soft, non-tender, bowel sounds are present. Extremities: 1+ edema bilaterally. Skin:  Neuro: Warm and dry. A/O x3, grossly nonfocal   cath site intact w/o hematoma or new bruit; distal pulse unchanged  Yes   Data Review:     Telemetry independently reviewed x sinus  chronic afib  parox afib  NSVT     ECG independently reviewed x NSR  afib x LVH; STT changes  NSST-Tw chgs    no new ECG provided for review   Lab results reviewed as noted below. Current medications reviewed as noted below. No results for input(s): PH, PCO2, PO2 in the last 72 hours. Recent Labs     02/04/19  0354 02/03/19  1814 02/03/19  1236   TROIQ 2.75* 2.62* 2.31*     Recent Labs     02/05/19  0303 02/04/19  0354 02/03/19  1236    138 143   K 4.1 3.9 4.1    104 108   CO2 29 25 30   BUN 15 15 17   CREA 1.31* 1.11 1.32*   GFRAA >60 >60 >60   GLU 83 104* 93   CA 8.2* 8.1* 7.9*   ALB  --   --  2.4*   WBC  --   --  13.3*   HGB  --   --  15.0   HCT  --   --  46.6   PLT  --   --  353     Lab Results   Component Value Date/Time    Cholesterol, total 160 02/05/2019 03:03 AM    HDL Cholesterol 36 02/05/2019 03:03 AM    LDL, calculated 107 (H) 02/05/2019 03:03 AM    Triglyceride 85 02/05/2019 03:03 AM    CHOL/HDL Ratio 4.4 02/05/2019 03:03 AM     Recent Labs     02/03/19  1236   SGOT 38*      TP 6.3*   ALB 2.4*   GLOB 3.9     No results for input(s): INR, PTP, APTT in the last 72 hours.     No lab exists for component: INREXT, INREXT   No components found for: [place]    Current Facility-Administered Medications   Medication Dose Route Frequency    acetaminophen (TYLENOL) tablet 650 mg  650 mg Oral Q4H PRN    carvedilol (COREG) tablet 3.125 mg  3.125 mg Oral BID WITH MEALS    potassium chloride (K-DUR, KLOR-CON) SR tablet 20 mEq  20 mEq Oral BID    enoxaparin (LOVENOX) injection 40 mg  40 mg SubCUTAneous BID    lisinopril (PRINIVIL, ZESTRIL) tablet 20 mg  20 mg Oral DAILY    furosemide (LASIX) injection 40 mg  40 mg IntraVENous BID    ondansetron (ZOFRAN) injection 4 mg  4 mg IntraVENous Q8H PRN    hydrALAZINE (APRESOLINE) 20 mg/mL injection 10 mg  10 mg IntraVENous Q6H PRN        Pham Russell MD

## 2019-02-05 NOTE — CARDIO/PULMONARY
C/P rehab note- chart reviewed. Cardiac Rehab consult acknowledged. Adm with Cardiomyopathy/CHF. EF 21-25%. HX includes HTN. Current everyday smoker per pt. Smoking cessation counseling given and smoking cessation link added to AVS.    Living with Heart Failure Booklet given to Brianna Gonsalves. He plans to purchase a scale to weigh today. DIET CARDIAC Regular; 2 GM NA (House Low NA)    Educated using teach back method. Discussed diagnosis definition and assessed patient understanding. Reviewed importance of daily weight monitoring and Low Sodium diet (2977-4120 mg. daily). Encouraged activity and rest periods within symptom limitations and as ordered by physician. Reviewed LASIX, purpose of medication, potential side effects, compliance, and what to do if dose is missed. Discussed importance of reporting signs and symptoms of exacerbation, and when to report them to the doctor, to prevent re-hospitalization. Brianna Gonsalves was encouraged to keep all appointments with doctor. Discussed the Cardiac Rehab Program, benefits, format, and encouraged enrollment, when eligible. Scheduled for OP Cardiac Rehab March 19 at 10:30 am      Reviewed in detail diet and changes that need to be made- pt is on board with reading labels and attending Cardiac Rehab for more education. Brianna Gonsalves could benefit from further education on the following HF topics: all.

## 2019-02-05 NOTE — PROGRESS NOTES
Problem: Falls - Risk of  Goal: *Absence of Falls  Document Wiley Fall Risk and appropriate interventions in the flowsheet. Outcome: Progressing Towards Goal  Fall Risk Interventions:     Patient will utilize call bell for assistance when experiencing signs of possible cardiovascular/respiratory distress, to prevent falls d/t (SOB, CP, Dizziness).

## 2019-03-19 ENCOUNTER — APPOINTMENT (OUTPATIENT)
Dept: CARDIAC REHAB | Age: 39
End: 2019-03-19

## 2022-01-19 ENCOUNTER — HOSPITAL ENCOUNTER (EMERGENCY)
Age: 42
Discharge: HOME | End: 2022-01-19
Attending: EMERGENCY MEDICINE
Payer: COMMERCIAL

## 2022-01-19 VITALS
WEIGHT: 315 LBS | RESPIRATION RATE: 16 BRPM | OXYGEN SATURATION: 97 % | SYSTOLIC BLOOD PRESSURE: 196 MMHG | HEART RATE: 92 BPM | DIASTOLIC BLOOD PRESSURE: 148 MMHG | TEMPERATURE: 98.8 F | HEIGHT: 72 IN | BODY MASS INDEX: 42.66 KG/M2

## 2022-01-19 DIAGNOSIS — Z20.822 PERSON UNDER INVESTIGATION FOR COVID-19: Primary | ICD-10-CM

## 2022-01-19 DIAGNOSIS — I10 HYPERTENSION, UNSPECIFIED TYPE: ICD-10-CM

## 2022-01-19 DIAGNOSIS — Z72.0 TOBACCO USE: ICD-10-CM

## 2022-01-19 PROCEDURE — 99281 EMR DPT VST MAYX REQ PHY/QHP: CPT

## 2022-01-19 PROCEDURE — U0005 INFEC AGEN DETEC AMPLI PROBE: HCPCS

## 2022-01-19 RX ORDER — IBUPROFEN 600 MG/1
600 TABLET ORAL
Qty: 20 TABLET | Refills: 0 | Status: SHIPPED | OUTPATIENT
Start: 2022-01-19 | End: 2022-09-26

## 2022-01-19 RX ORDER — BENZONATATE 100 MG/1
100 CAPSULE ORAL
Qty: 20 CAPSULE | Refills: 0 | Status: SHIPPED | OUTPATIENT
Start: 2022-01-19 | End: 2022-01-26

## 2022-01-19 NOTE — Clinical Note
[address]  [facility] EMERGENCY DEPT  [address] [id number] [phone]    Work/School Note    Date: 1/19/2022    To Whom It May concern:      Melvi Green was seen and treated today in the emergency room by the following provider(s):  Attending Provider: Vick Wen MD.      Melvi Green is excused from work/school on 01/19/22. He is clear to return to work/school on 01/20/22.         Sincerely,          Madeleine Deluna MD

## 2022-01-20 LAB
SARS-COV-2, XPLCVT: DETECTED
SOURCE, COVRS: ABNORMAL

## 2022-01-20 NOTE — ED PROVIDER NOTES
EMERGENCY DEPARTMENT HISTORY AND PHYSICAL EXAM      Date: 1/19/2022  Patient Name: Bren Enriquez    History of Presenting Illness     Chief Complaint   Patient presents with    Fever     Patient in for covid test to confirm exposure to covid 19. Patient reports of fever and chills for 1 day. Denies diarrhea, nausea or vomiting. History Provided By: Patient    HPI: Bren Enriquez, 37 y.o. male with PMHx as noted below who presents to the emergency department with chief complaint of fever, body aches chills. Reports onset of symptoms earlier today. Has had a positive COVID contact. Symptoms are mild and relieved with over-the-counter antipyretics. Symptoms are nonradiating. Pt denies any other alleviating or exacerbating factors. Additionally, pt specifically denies any chest pain, severe dyspnea, AMS    PCP: Dina Rinne, MD    Current Outpatient Medications   Medication Sig Dispense Refill    ibuprofen (MOTRIN) 600 mg tablet Take 1 Tablet by mouth every six (6) hours as needed for Pain. 20 Tablet 0    benzonatate (Tessalon Perles) 100 mg capsule Take 1 Capsule by mouth three (3) times daily as needed for Cough for up to 7 days. 20 Capsule 0    aspirin delayed-release 81 mg tablet Take 1 Tab by mouth daily. 30 Tab 12    carvedilol (COREG) 6.25 mg tablet Take 1 Tab by mouth two (2) times daily (with meals). 60 Tab 12    furosemide (LASIX) 40 mg tablet 30 30 Tab 12    lisinopril (PRINIVIL, ZESTRIL) 20 mg tablet Take 1 Tab by mouth daily. 30 Tab 12    potassium chloride SR (KLOR-CON 10) 10 mEq tablet Take 1 Tab by mouth daily. 27 Tab 12       Past History     Past Medical History:  Past Medical History:   Diagnosis Date    Hypertension     Infectious disease     trichomonas        Past Surgical History:  No past surgical history on file. Family History:  No family history on file.     Social History:  Social History     Tobacco Use    Smoking status: Former Smoker     Packs/day: 0.50 Quit date: 6/15/2014     Years since quittin.6    Smokeless tobacco: Not on file   Substance Use Topics    Alcohol use: Yes     Comment: socially    Drug use: No       Allergies:  No Known Allergies      Review of Systems   Review of Systems  Constitutional: positive for fever, chills, and fatigue. HENT: Negative for congestion. Negative sore throat, neck stiffness   Eyes: Negative for discharge and redness. Respiratory: Negative for  shortness of breath, wheezing   Cardiovascular: Negative for chest pain, palpitations   Gastrointestinal: Negative for nausea, vomiting, abdominal pain, constipation, diarrhea and blood in stool. Genitourinary: Negative for dysuria, hematuria, flank pain, decreased urine volume, discharge,   Musculoskeletal: positive for myalgias. Negative joint pain . Skin: Negative for rash or lesions . Neurological: Negative weakness, light-headedness, numbness and headaches. Physical Exam   Physical Exam    GENERAL: alert and oriented, no acute distress  EYES: PEERL, No injection, discharge or icterus. ENT: Mucous membranes pink and moist.  NECK: Supple  LUNGS: Airway patent. Non-labored respirations. Breath sounds clear with good air entry bilaterally. HEART: Regular rate and rhythm. No peripheral edema  ABDOMEN: Non-distended and non-tender, without guarding or rebound. SKIN:  warm, dry  MSK/EXTREMITIES: Without swelling, tenderness or deformity, symmetric with normal ROM  NEUROLOGICAL: Alert, oriented      Diagnostic Study Results     Labs -   No results found for this or any previous visit (from the past 12 hour(s)). Radiologic Studies -   No orders to display     CT Results  (Last 48 hours)    None        CXR Results  (Last 48 hours)    None            Medical Decision Making     IMaría MD am the first provider for this patient and am the attending of record for this patient encounter.     I reviewed the vital signs, available nursing notes, past medical history, past surgical history, family history and social history. Vital Signs-Reviewed the patient's vital signs. No data found. Pulse Oximetry Analysis - 97% on RA      Records Reviewed: Nursing Notes and Old Medical Records    Provider Notes (Medical Decision Making): On presentation, the patient is well appearing, in no acute distress with vital signs notable for hypertension. Based on my history and exam the differential diagnosis for this patient includes COVID 19, flu, URI, sinusitis, bronchitis. Nothing to suggest strep pharyngitis, PNA or bacterial sinusitis. Covid test sent and pending. Patient was notified of his elevated blood pressure, states he has not taken his home blood pressure medication today. I did warn patient of consequences of long-term uncontrolled hypertension. There is no sign of any endorgan damage that would warrant acute lowering of his blood pressure in the emergency department. Patient may have COVID 19, however is breathing comfortably, maintaining adequate O2 sats on room air is tolerating PO and is overall well appearing so does not require admission at this time. Have recommended self quarantine per CDC guidelines. Symptomatic therapy suggested. Should return immediately to the emergency department develops shortness of breath, confusion, weakness or any othe new/worrisome symptoms       ED Course:   Initial assessment performed. The patients presenting problems have been discussed, and they are in agreement with the care plan formulated and outlined with them. I have encouraged them to ask questions as they arise throughout their visit. Tobacco Cessation Counseling   I spent 3 minutes discussing the medical risks of prolonged smoking habits and advised the patient of the benefits of the cessation of smoking, providing specific suggestions on how to quit.   Alfredo Nieves MD   .      Rob Trinidad's  results have been reviewed with him.  He has been counseled regarding his diagnosis. He verbally conveys understanding and agreement of the signs, symptoms, diagnosis, treatment and prognosis and additionally agrees to follow up as recommended. He also agrees with the care-plan and conveys that all of his questions have been answered. I have also put together some discharge instructions for him that include: 1) educational information regarding their diagnosis, 2) how to care for their diagnosis at home, as well a 3) list of reasons why they would want to return to the ED prior to their follow-up appointment, should their condition change. Disposition:  home    PLAN:  1. Discharge Medication List as of 1/19/2022  8:22 PM      START taking these medications    Details   ibuprofen (MOTRIN) 600 mg tablet Take 1 Tablet by mouth every six (6) hours as needed for Pain., Normal, Disp-20 Tablet, R-0      benzonatate (Tessalon Perles) 100 mg capsule Take 1 Capsule by mouth three (3) times daily as needed for Cough for up to 7 days. , Normal, Disp-20 Capsule, R-0         CONTINUE these medications which have NOT CHANGED    Details   aspirin delayed-release 81 mg tablet Take 1 Tab by mouth daily. , No Print, Disp-30 Tab, R-12      carvedilol (COREG) 6.25 mg tablet Take 1 Tab by mouth two (2) times daily (with meals). , Print, Disp-60 Tab, R-12      furosemide (LASIX) 40 mg tablet 30, Print, Disp-30 Tab, R-12      lisinopril (PRINIVIL, ZESTRIL) 20 mg tablet Take 1 Tab by mouth daily. , Print, Disp-30 Tab, R-12      potassium chloride SR (KLOR-CON 10) 10 mEq tablet Take 1 Tab by mouth daily. , Print, Disp-30 Tab, R-12           2.    Follow-up Information     Follow up With Specialties Details Why Contact Paulie Whitfield MD Family Medicine Schedule an appointment as soon as possible for a visit in 2 days  [address] [facility] [place]  [phone]      [facility] EMERGENCY DEPT Emergency Medicine  If symptoms worsen [address] [facility] [place]  [phone]        Return to ED if worse     Diagnosis     Clinical Impression:   1. Person under investigation for COVID-19    2. Hypertension, unspecified type    3. Tobacco use        Please note that this dictation was completed with Dragon, computer voice recognition software. Quite often unanticipated grammatical, syntax, homophones, and other interpretive errors are inadvertently transcribed by the computer software. Please disregard these errors. Additionally, please excuse any errors that have escaped final proofreading.

## 2022-01-20 NOTE — PROGRESS NOTES
Regarding COVID. Reach patient by telephone, demographics verified. Patient notified of positive COVID test.  Patient's questions answered satisfactorily.

## 2022-03-20 PROBLEM — I42.9 CARDIOMYOPATHY (HCC): Status: ACTIVE | Noted: 2019-02-03

## 2022-09-26 ENCOUNTER — HOSPITAL ENCOUNTER (INPATIENT)
Age: 42
LOS: 4 days | Discharge: HOME | DRG: 280 | End: 2022-09-30
Attending: EMERGENCY MEDICINE | Admitting: STUDENT IN AN ORGANIZED HEALTH CARE EDUCATION/TRAINING PROGRAM
Payer: COMMERCIAL

## 2022-09-26 ENCOUNTER — APPOINTMENT (OUTPATIENT)
Dept: GENERAL RADIOLOGY | Age: 42
DRG: 280 | End: 2022-09-26
Attending: EMERGENCY MEDICINE
Payer: COMMERCIAL

## 2022-09-26 DIAGNOSIS — I50.9 ACUTE HEART FAILURE, UNSPECIFIED HEART FAILURE TYPE (HCC): Primary | ICD-10-CM

## 2022-09-26 DIAGNOSIS — I50.21 ACUTE HFREF (HEART FAILURE WITH REDUCED EJECTION FRACTION) (HCC): ICD-10-CM

## 2022-09-26 LAB
ALBUMIN SERPL-MCNC: 2.6 G/DL (ref 3.5–5)
ALBUMIN/GLOB SERPL: 0.6 {RATIO} (ref 1.1–2.2)
ALP SERPL-CCNC: 81 U/L (ref 45–117)
ALT SERPL-CCNC: 24 U/L (ref 12–78)
ANION GAP SERPL CALC-SCNC: 5 MMOL/L (ref 5–15)
AST SERPL-CCNC: 23 U/L (ref 15–37)
BASOPHILS # BLD: 0.1 K/UL (ref 0–0.1)
BASOPHILS NFR BLD: 0 % (ref 0–1)
BILIRUB SERPL-MCNC: 0.6 MG/DL (ref 0.2–1)
BNP SERPL-MCNC: 7237 PG/ML
BUN SERPL-MCNC: 23 MG/DL (ref 6–20)
BUN/CREAT SERPL: 14 (ref 12–20)
CALCIUM SERPL-MCNC: 8.4 MG/DL (ref 8.5–10.1)
CHLORIDE SERPL-SCNC: 107 MMOL/L (ref 97–108)
CO2 SERPL-SCNC: 31 MMOL/L (ref 21–32)
CREAT SERPL-MCNC: 1.65 MG/DL (ref 0.7–1.3)
DIFFERENTIAL METHOD BLD: ABNORMAL
EOSINOPHIL # BLD: 0.2 K/UL (ref 0–0.4)
EOSINOPHIL NFR BLD: 1 % (ref 0–7)
ERYTHROCYTE [DISTWIDTH] IN BLOOD BY AUTOMATED COUNT: 14.6 % (ref 11.5–14.5)
GLOBULIN SER CALC-MCNC: 4.3 G/DL (ref 2–4)
GLUCOSE SERPL-MCNC: 115 MG/DL (ref 65–100)
HCT VFR BLD AUTO: 45.5 % (ref 36.6–50.3)
HGB BLD-MCNC: 14.2 G/DL (ref 12.1–17)
IMM GRANULOCYTES # BLD AUTO: 0 K/UL (ref 0–0.04)
IMM GRANULOCYTES NFR BLD AUTO: 0 % (ref 0–0.5)
LYMPHOCYTES # BLD: 2.6 K/UL (ref 0.8–3.5)
LYMPHOCYTES NFR BLD: 17 % (ref 12–49)
MCH RBC QN AUTO: 29.6 PG (ref 26–34)
MCHC RBC AUTO-ENTMCNC: 31.2 G/DL (ref 30–36.5)
MCV RBC AUTO: 94.8 FL (ref 80–99)
MONOCYTES # BLD: 1.3 K/UL (ref 0–1)
MONOCYTES NFR BLD: 9 % (ref 5–13)
NEUTS SEG # BLD: 10.6 K/UL (ref 1.8–8)
NEUTS SEG NFR BLD: 73 % (ref 32–75)
NRBC # BLD: 0 K/UL (ref 0–0.01)
NRBC BLD-RTO: 0 PER 100 WBC
PLATELET # BLD AUTO: 348 K/UL (ref 150–400)
PMV BLD AUTO: 10.1 FL (ref 8.9–12.9)
POTASSIUM SERPL-SCNC: 4.5 MMOL/L (ref 3.5–5.1)
PROT SERPL-MCNC: 6.9 G/DL (ref 6.4–8.2)
RBC # BLD AUTO: 4.8 M/UL (ref 4.1–5.7)
SODIUM SERPL-SCNC: 143 MMOL/L (ref 136–145)
TROPONIN-HIGH SENSITIVITY: 317 NG/L (ref 0–76)
TROPONIN-HIGH SENSITIVITY: 320 NG/L (ref 0–76)
WBC # BLD AUTO: 14.7 K/UL (ref 4.1–11.1)

## 2022-09-26 PROCEDURE — 65270000029 HC RM PRIVATE

## 2022-09-26 PROCEDURE — 65270000046 HC RM TELEMETRY

## 2022-09-26 PROCEDURE — 83880 ASSAY OF NATRIURETIC PEPTIDE: CPT

## 2022-09-26 PROCEDURE — 71045 X-RAY EXAM CHEST 1 VIEW: CPT

## 2022-09-26 PROCEDURE — 96374 THER/PROPH/DIAG INJ IV PUSH: CPT

## 2022-09-26 PROCEDURE — 80053 COMPREHEN METABOLIC PANEL: CPT

## 2022-09-26 PROCEDURE — 84484 ASSAY OF TROPONIN QUANT: CPT

## 2022-09-26 PROCEDURE — 74011250636 HC RX REV CODE- 250/636: Performed by: EMERGENCY MEDICINE

## 2022-09-26 PROCEDURE — 96375 TX/PRO/DX INJ NEW DRUG ADDON: CPT

## 2022-09-26 PROCEDURE — 74011000250 HC RX REV CODE- 250: Performed by: EMERGENCY MEDICINE

## 2022-09-26 PROCEDURE — 93005 ELECTROCARDIOGRAM TRACING: CPT

## 2022-09-26 PROCEDURE — 99285 EMERGENCY DEPT VISIT HI MDM: CPT

## 2022-09-26 PROCEDURE — 85025 COMPLETE CBC W/AUTO DIFF WBC: CPT

## 2022-09-26 PROCEDURE — 36415 COLL VENOUS BLD VENIPUNCTURE: CPT

## 2022-09-26 PROCEDURE — 94762 N-INVAS EAR/PLS OXIMTRY CONT: CPT

## 2022-09-26 RX ORDER — ACETAMINOPHEN 650 MG/1
650 SUPPOSITORY RECTAL
Status: DISCONTINUED | OUTPATIENT
Start: 2022-09-26 | End: 2022-09-30 | Stop reason: HOSPADM

## 2022-09-26 RX ORDER — HYDRALAZINE HYDROCHLORIDE 20 MG/ML
10 INJECTION INTRAMUSCULAR; INTRAVENOUS
Status: DISCONTINUED | OUTPATIENT
Start: 2022-09-26 | End: 2022-09-27

## 2022-09-26 RX ORDER — SODIUM CHLORIDE 0.9 % (FLUSH) 0.9 %
5-40 SYRINGE (ML) INJECTION AS NEEDED
Status: DISCONTINUED | OUTPATIENT
Start: 2022-09-26 | End: 2022-09-30 | Stop reason: HOSPADM

## 2022-09-26 RX ORDER — ONDANSETRON 2 MG/ML
4 INJECTION INTRAMUSCULAR; INTRAVENOUS
Status: DISCONTINUED | OUTPATIENT
Start: 2022-09-26 | End: 2022-09-30 | Stop reason: HOSPADM

## 2022-09-26 RX ORDER — ENOXAPARIN SODIUM 100 MG/ML
40 INJECTION SUBCUTANEOUS EVERY 12 HOURS
Status: DISCONTINUED | OUTPATIENT
Start: 2022-09-27 | End: 2022-09-30 | Stop reason: HOSPADM

## 2022-09-26 RX ORDER — ONDANSETRON 4 MG/1
4 TABLET, ORALLY DISINTEGRATING ORAL
Status: DISCONTINUED | OUTPATIENT
Start: 2022-09-26 | End: 2022-09-30 | Stop reason: HOSPADM

## 2022-09-26 RX ORDER — SPIRONOLACTONE 25 MG/1
25 TABLET ORAL DAILY
Status: DISCONTINUED | OUTPATIENT
Start: 2022-09-27 | End: 2022-09-30 | Stop reason: HOSPADM

## 2022-09-26 RX ORDER — LABETALOL HYDROCHLORIDE 5 MG/ML
10 INJECTION, SOLUTION INTRAVENOUS
Status: COMPLETED | OUTPATIENT
Start: 2022-09-26 | End: 2022-09-26

## 2022-09-26 RX ORDER — CARVEDILOL 12.5 MG/1
12.5 TABLET ORAL 2 TIMES DAILY WITH MEALS
Status: DISCONTINUED | OUTPATIENT
Start: 2022-09-27 | End: 2022-09-27

## 2022-09-26 RX ORDER — FUROSEMIDE 10 MG/ML
40 INJECTION INTRAMUSCULAR; INTRAVENOUS EVERY 12 HOURS
Status: DISCONTINUED | OUTPATIENT
Start: 2022-09-27 | End: 2022-09-29

## 2022-09-26 RX ORDER — POLYETHYLENE GLYCOL 3350 17 G/17G
17 POWDER, FOR SOLUTION ORAL DAILY PRN
Status: DISCONTINUED | OUTPATIENT
Start: 2022-09-26 | End: 2022-09-30 | Stop reason: HOSPADM

## 2022-09-26 RX ORDER — SPIRONOLACTONE 25 MG/1
25 TABLET ORAL 2 TIMES DAILY
Status: ON HOLD | COMMUNITY
End: 2022-09-30 | Stop reason: SDUPTHER

## 2022-09-26 RX ORDER — SODIUM CHLORIDE 0.9 % (FLUSH) 0.9 %
5-40 SYRINGE (ML) INJECTION EVERY 8 HOURS
Status: DISCONTINUED | OUTPATIENT
Start: 2022-09-26 | End: 2022-09-30 | Stop reason: HOSPADM

## 2022-09-26 RX ORDER — ROSUVASTATIN CALCIUM 20 MG/1
20 TABLET, COATED ORAL
Status: DISCONTINUED | OUTPATIENT
Start: 2022-09-26 | End: 2022-09-30 | Stop reason: HOSPADM

## 2022-09-26 RX ORDER — FUROSEMIDE 10 MG/ML
40 INJECTION INTRAMUSCULAR; INTRAVENOUS
Status: COMPLETED | OUTPATIENT
Start: 2022-09-26 | End: 2022-09-26

## 2022-09-26 RX ORDER — CARVEDILOL 3.12 MG/1
6.25 TABLET ORAL 2 TIMES DAILY WITH MEALS
Status: DISCONTINUED | OUTPATIENT
Start: 2022-09-27 | End: 2022-09-26

## 2022-09-26 RX ORDER — ACETAMINOPHEN 325 MG/1
650 TABLET ORAL
Status: DISCONTINUED | OUTPATIENT
Start: 2022-09-26 | End: 2022-09-30 | Stop reason: HOSPADM

## 2022-09-26 RX ORDER — ASPIRIN 81 MG/1
81 TABLET ORAL DAILY
Status: DISCONTINUED | OUTPATIENT
Start: 2022-09-27 | End: 2022-09-30 | Stop reason: HOSPADM

## 2022-09-26 RX ADMIN — FUROSEMIDE 40 MG: 10 INJECTION, SOLUTION INTRAMUSCULAR; INTRAVENOUS at 21:31

## 2022-09-26 RX ADMIN — LABETALOL HYDROCHLORIDE 10 MG: 5 INJECTION INTRAVENOUS at 21:35

## 2022-09-27 LAB
ANION GAP SERPL CALC-SCNC: 3 MMOL/L (ref 5–15)
BASOPHILS # BLD: 0.1 K/UL (ref 0–0.1)
BASOPHILS NFR BLD: 1 % (ref 0–1)
BUN SERPL-MCNC: 20 MG/DL (ref 6–20)
BUN/CREAT SERPL: 13 (ref 12–20)
CALCIUM SERPL-MCNC: 8.6 MG/DL (ref 8.5–10.1)
CHLORIDE SERPL-SCNC: 103 MMOL/L (ref 97–108)
CHOLEST SERPL-MCNC: 126 MG/DL
CO2 SERPL-SCNC: 33 MMOL/L (ref 21–32)
CREAT SERPL-MCNC: 1.57 MG/DL (ref 0.7–1.3)
DIFFERENTIAL METHOD BLD: ABNORMAL
EOSINOPHIL # BLD: 0.1 K/UL (ref 0–0.4)
EOSINOPHIL NFR BLD: 1 % (ref 0–7)
ERYTHROCYTE [DISTWIDTH] IN BLOOD BY AUTOMATED COUNT: 14.8 % (ref 11.5–14.5)
EST. AVERAGE GLUCOSE BLD GHB EST-MCNC: 126 MG/DL
GLUCOSE SERPL-MCNC: 108 MG/DL (ref 65–100)
HBA1C MFR BLD: 6 % (ref 4–5.6)
HCT VFR BLD AUTO: 51.1 % (ref 36.6–50.3)
HDLC SERPL-MCNC: 26 MG/DL
HDLC SERPL: 4.8 {RATIO} (ref 0–5)
HGB BLD-MCNC: 15.9 G/DL (ref 12.1–17)
IMM GRANULOCYTES # BLD AUTO: 0 K/UL (ref 0–0.04)
IMM GRANULOCYTES NFR BLD AUTO: 0 % (ref 0–0.5)
LDLC SERPL CALC-MCNC: 77.2 MG/DL (ref 0–100)
LYMPHOCYTES # BLD: 1.6 K/UL (ref 0.8–3.5)
LYMPHOCYTES NFR BLD: 12 % (ref 12–49)
MAGNESIUM SERPL-MCNC: 1.8 MG/DL (ref 1.6–2.4)
MAGNESIUM SERPL-MCNC: 2.1 MG/DL (ref 1.6–2.4)
MCH RBC QN AUTO: 29.2 PG (ref 26–34)
MCHC RBC AUTO-ENTMCNC: 31.1 G/DL (ref 30–36.5)
MCV RBC AUTO: 93.9 FL (ref 80–99)
MONOCYTES # BLD: 1.1 K/UL (ref 0–1)
MONOCYTES NFR BLD: 8 % (ref 5–13)
NEUTS SEG # BLD: 10.3 K/UL (ref 1.8–8)
NEUTS SEG NFR BLD: 78 % (ref 32–75)
NRBC # BLD: 0 K/UL (ref 0–0.01)
NRBC BLD-RTO: 0 PER 100 WBC
PLATELET # BLD AUTO: 353 K/UL (ref 150–400)
PMV BLD AUTO: 9.8 FL (ref 8.9–12.9)
POTASSIUM SERPL-SCNC: 3.9 MMOL/L (ref 3.5–5.1)
RBC # BLD AUTO: 5.44 M/UL (ref 4.1–5.7)
SODIUM SERPL-SCNC: 139 MMOL/L (ref 136–145)
TRIGL SERPL-MCNC: 114 MG/DL (ref ?–150)
TSH SERPL DL<=0.05 MIU/L-ACNC: 4.27 UIU/ML (ref 0.36–3.74)
VLDLC SERPL CALC-MCNC: 22.8 MG/DL
WBC # BLD AUTO: 13.1 K/UL (ref 4.1–11.1)

## 2022-09-27 PROCEDURE — 80048 BASIC METABOLIC PNL TOTAL CA: CPT

## 2022-09-27 PROCEDURE — 36415 COLL VENOUS BLD VENIPUNCTURE: CPT

## 2022-09-27 PROCEDURE — 74011250637 HC RX REV CODE- 250/637: Performed by: STUDENT IN AN ORGANIZED HEALTH CARE EDUCATION/TRAINING PROGRAM

## 2022-09-27 PROCEDURE — 83735 ASSAY OF MAGNESIUM: CPT

## 2022-09-27 PROCEDURE — 85025 COMPLETE CBC W/AUTO DIFF WBC: CPT

## 2022-09-27 PROCEDURE — 74011000250 HC RX REV CODE- 250: Performed by: NURSE PRACTITIONER

## 2022-09-27 PROCEDURE — 74011250636 HC RX REV CODE- 250/636: Performed by: NURSE PRACTITIONER

## 2022-09-27 PROCEDURE — 83036 HEMOGLOBIN GLYCOSYLATED A1C: CPT

## 2022-09-27 PROCEDURE — 74011250637 HC RX REV CODE- 250/637: Performed by: INTERNAL MEDICINE

## 2022-09-27 PROCEDURE — 74011250636 HC RX REV CODE- 250/636: Performed by: STUDENT IN AN ORGANIZED HEALTH CARE EDUCATION/TRAINING PROGRAM

## 2022-09-27 PROCEDURE — 65270000046 HC RM TELEMETRY

## 2022-09-27 PROCEDURE — 84443 ASSAY THYROID STIM HORMONE: CPT

## 2022-09-27 PROCEDURE — 80061 LIPID PANEL: CPT

## 2022-09-27 PROCEDURE — 74011000250 HC RX REV CODE- 250: Performed by: STUDENT IN AN ORGANIZED HEALTH CARE EDUCATION/TRAINING PROGRAM

## 2022-09-27 RX ORDER — HYDRALAZINE HYDROCHLORIDE 20 MG/ML
10 INJECTION INTRAMUSCULAR; INTRAVENOUS
Status: DISCONTINUED | OUTPATIENT
Start: 2022-09-27 | End: 2022-09-28

## 2022-09-27 RX ORDER — LABETALOL HYDROCHLORIDE 5 MG/ML
10 INJECTION, SOLUTION INTRAVENOUS
Status: DISCONTINUED | OUTPATIENT
Start: 2022-09-27 | End: 2022-09-28

## 2022-09-27 RX ORDER — CLONIDINE HYDROCHLORIDE 0.1 MG/1
0.1 TABLET ORAL
Status: DISCONTINUED | OUTPATIENT
Start: 2022-09-27 | End: 2022-09-28

## 2022-09-27 RX ORDER — MAGNESIUM SULFATE 1 G/100ML
1 INJECTION INTRAVENOUS ONCE
Status: COMPLETED | OUTPATIENT
Start: 2022-09-27 | End: 2022-09-27

## 2022-09-27 RX ORDER — CARVEDILOL 25 MG/1
TABLET ORAL
COMMUNITY
Start: 2022-09-22 | End: 2022-09-30

## 2022-09-27 RX ORDER — LISINOPRIL 40 MG/1
TABLET ORAL
COMMUNITY
Start: 2022-09-22 | End: 2022-09-30

## 2022-09-27 RX ORDER — CARVEDILOL 12.5 MG/1
25 TABLET ORAL 2 TIMES DAILY WITH MEALS
Status: DISCONTINUED | OUTPATIENT
Start: 2022-09-27 | End: 2022-09-30 | Stop reason: HOSPADM

## 2022-09-27 RX ADMIN — SODIUM CHLORIDE, PRESERVATIVE FREE 5 ML: 5 INJECTION INTRAVENOUS at 01:15

## 2022-09-27 RX ADMIN — HYDRALAZINE HYDROCHLORIDE 10 MG: 20 INJECTION INTRAMUSCULAR; INTRAVENOUS at 11:19

## 2022-09-27 RX ADMIN — ENOXAPARIN SODIUM 40 MG: 100 INJECTION SUBCUTANEOUS at 08:37

## 2022-09-27 RX ADMIN — SODIUM CHLORIDE, PRESERVATIVE FREE 10 ML: 5 INJECTION INTRAVENOUS at 14:35

## 2022-09-27 RX ADMIN — MAGNESIUM SULFATE HEPTAHYDRATE 1 G: 1 INJECTION, SOLUTION INTRAVENOUS at 03:56

## 2022-09-27 RX ADMIN — ROSUVASTATIN CALCIUM 20 MG: 20 TABLET, FILM COATED ORAL at 01:15

## 2022-09-27 RX ADMIN — LABETALOL HYDROCHLORIDE 10 MG: 5 INJECTION INTRAVENOUS at 04:28

## 2022-09-27 RX ADMIN — CLONIDINE HYDROCHLORIDE 0.1 MG: 0.1 TABLET ORAL at 03:44

## 2022-09-27 RX ADMIN — SPIRONOLACTONE 25 MG: 25 TABLET ORAL at 08:37

## 2022-09-27 RX ADMIN — SODIUM CHLORIDE, PRESERVATIVE FREE 10 ML: 5 INJECTION INTRAVENOUS at 05:07

## 2022-09-27 RX ADMIN — ROSUVASTATIN CALCIUM 20 MG: 20 TABLET, FILM COATED ORAL at 21:15

## 2022-09-27 RX ADMIN — SODIUM CHLORIDE, PRESERVATIVE FREE 10 ML: 5 INJECTION INTRAVENOUS at 21:15

## 2022-09-27 RX ADMIN — CARVEDILOL 12.5 MG: 12.5 TABLET, FILM COATED ORAL at 08:37

## 2022-09-27 RX ADMIN — CARVEDILOL 25 MG: 12.5 TABLET, FILM COATED ORAL at 17:06

## 2022-09-27 RX ADMIN — CLONIDINE HYDROCHLORIDE 0.1 MG: 0.1 TABLET ORAL at 06:21

## 2022-09-27 RX ADMIN — HYDRALAZINE HYDROCHLORIDE 10 MG: 20 INJECTION INTRAMUSCULAR; INTRAVENOUS at 05:07

## 2022-09-27 RX ADMIN — ENOXAPARIN SODIUM 40 MG: 100 INJECTION SUBCUTANEOUS at 21:15

## 2022-09-27 RX ADMIN — LABETALOL HYDROCHLORIDE 10 MG: 5 INJECTION INTRAVENOUS at 21:15

## 2022-09-27 RX ADMIN — FUROSEMIDE 40 MG: 10 INJECTION, SOLUTION INTRAMUSCULAR; INTRAVENOUS at 14:34

## 2022-09-27 RX ADMIN — HYDRALAZINE HYDROCHLORIDE 10 MG: 20 INJECTION INTRAMUSCULAR; INTRAVENOUS at 01:15

## 2022-09-27 RX ADMIN — FUROSEMIDE 40 MG: 10 INJECTION, SOLUTION INTRAMUSCULAR; INTRAVENOUS at 05:07

## 2022-09-27 RX ADMIN — ASPIRIN 81 MG: 81 TABLET, COATED ORAL at 08:37

## 2022-09-27 NOTE — ED NOTES
Pt placed on 2L nasal cannula- O2 sats ranging from 88-92% on RA-pt states understanding and tolerating at this time.

## 2022-09-27 NOTE — ED NOTES
Spoke with Dr Derek Rivera regarding pt remaining hypertensive, provider is going to put in new orders.

## 2022-09-27 NOTE — ED NOTES
Pt given ordered medications along with 3 urinals-pt educated on the importance of monitoring intake and output-pt states understanding.

## 2022-09-27 NOTE — ED NOTES
Pt remains hypertensive, ordered PRN medication given. Pt remains AOX4, PWD, denies any other needs-call light within reach.

## 2022-09-27 NOTE — PROGRESS NOTES
TRANSFER - IN REPORT:    Verbal report received from [facility] on Yani Rodriguez  being received from ED for routine progression of care      Report consisted of patients Situation, Background, Assessment and   Recommendations(SBAR). Information from the following report(s) ED Summary was reviewed with the receiving nurse. Opportunity for questions and clarification was provided. Assessment completed upon patients arrival to unit and care assumed. End of Shift Note    Bedside shift change report given to Jeanie Escalante (oncoming nurse) by J Luis Dhillon RN (offgoing nurse). Report included the following information SBAR, Kardex, Intake/Output, MAR, Recent Results, Med Rec Status, and Alarm Parameters     Shift worked:  [id number]     Shift summary and any significant changes:     Pt arrived to PCU from ED at 0340 with SBPs 190s-200s, MD made aware and altered PRNs. Pt received 2x 0.1mg PO clonodine, 10mg IV hydralazine, 10mg labatelol, and 40mg IV lasix resulting in SBPs 180s-150s- MD aware. Pt on 2L NC w no respiratory complaints but objectively SOB with exertion. Frequent complaints of intermittent cramps in right side and leg when sitting/lying down, pt declined pain meds and is resting in recliner. Concerns for physician to address: Further BP management     Zone phone for oncoming shift:       Activity:  Activity Level:  Up with Assistance  Number times ambulated in hallways past shift: 0  Number of times OOB to chair past shift: 2    Cardiac:   Cardiac Monitoring: Yes      Cardiac Rhythm: Sinus Tachy    Access:  Current line(s): PIV     Genitourinary:   Urinary status: voiding    Respiratory:   O2 Device: Nasal cannula  Chronic home O2 use?: NO  Incentive spirometer at bedside: YES       GI:  Last Bowel Movement Date:  (pta- per pt 9/26 am)  Current diet:  ADULT DIET Regular; Low Fat/Low Chol/High Fiber/2 gm Na  Passing flatus: YES  Tolerating current diet: YES       Pain Management: Patient states pain is manageable on current regimen: YES    Skin:  Karthikeyan Score: 21  Interventions: increase time out of bed, PT/OT consult, limit briefs, and nutritional support     Patient Safety:  Fall Score:  Total Score: 3  Interventions: bed/chair alarm, gripper socks, pt to call before getting OOB, and stay with me (per policy)  High Fall Risk: Yes    Length of Stay:  Expected LOS: - - -  Actual LOS: 1      Geneva Foreman RN

## 2022-09-27 NOTE — H&P
This note is compiled to communicate with the medical care team. It may contain sensitive and protected information. It is not intended to serve as a source of communication with patients/families/friends; that communication occurs at the bedside or via alternative direct communications means (secure messaging, letters, video/telephone, etc.). Hospitalist Admission Note    NAME: Claudia Gomez   :  1980   MRN:  [id number]     Date/Time:  2022 11:35 PM    Patient PCP: Lien Fatima MD  ______________________________________________________________________  Given the patient's current clinical presentation, I have a high level of concern for decompensation if discharged from the emergency department. Complex decision making was performed, which includes reviewing the patient's available past medical records, laboratory results, and x-ray films. My assessment of this patient's clinical condition and my plan of care is as follows. Subjective:   CHIEF COMPLAINT: Shortness of breath, bilateral lower extremity swelling    HISTORY OF PRESENT ILLNESS:     Claudia Gomez is a 37 y.o.  male with pertinent past medical history of essential hypertension, former tobacco use (quit 3 months prior), daily marijuana use, and HFrEF (EF 20-25% in 2019) lost to cardiology follow-up who presents with complaints of bilateral lower extremity swelling for the past 2-3 weeks now involving abdominal swelling and mild shortness of breath with orthopnea. Patient reports no memory of CHF diagnosis and states he was going through a bad divorce at that time and did not follow-up with cardiology as he was directed. He has recently been switched off of his Lasix which was replaced with spironolactone. He denies any other associated symptoms and ROS otherwise negative. He has been referred for sleep study, but has not scheduled the appointment.     In the ED, patient afebrile, borderline tachycardic with heart rates around 100 bpm hypertensive 190s/140s saturating mid 90s on 2 L/min by nasal cannula. ECG demonstrates sinus tachycardia with evidence of biatrial enlargement and left axis deviation without definitive ischemic changes (lateral ST changes noted). CXR demonstrates marked cardiomegaly. Labs demonstrate: Stable troponin 320-> 317, sodium 143, potassium 4.5, BUN 23, creatinine 1.65 (baseline 1.1-1.3), proBNP 7237 (similar to prior admission for CHF), WBC 14.7, hemoglobin 14.2, platelets 354. We were asked to admit for work up and evaluation of the above problems. Past Medical History:   Diagnosis Date    Hypertension     Infectious disease     trichomonas         No past surgical history on file. Social History     Tobacco Use    Smoking status: Former     Packs/day: 0.50     Types: Cigarettes     Quit date: 6/15/2014     Years since quittin.2    Smokeless tobacco: Not on file   Substance Use Topics    Alcohol use: Yes     Comment: socially        No family history on file. He denies familial syndromes    No Known Allergies     Prior to Admission medications    Medication Sig Start Date End Date Taking? Authorizing Provider   spironolactone (ALDACTONE) 25 mg tablet Take 25 mg by mouth two (2) times a day. Yes Provider, Historical   aspirin delayed-release 81 mg tablet Take 1 Tab by mouth daily. 19  Yes Bertram Libman, MD   carvedilol (COREG) 6.25 mg tablet Take 1 Tab by mouth two (2) times daily (with meals). 19  Yes Bertram Libman, MD   lisinopril (PRINIVIL, ZESTRIL) 20 mg tablet Take 1 Tab by mouth daily.  19  Yes Bertram Libman, MD       REVIEW OF SYSTEMS:       Total of 12 systems reviewed as follows:       POSITIVE= Red text   General: Fever, chills, sweats, weakness/malaise, weight loss/gain, loss of appetite    Eyes:   Vision change, eye pain   ENT:    Rhinorrhea, pharyngitis, Hearing loss    Respiratory:   cough, sputum production, SOB, MARSHALL, wheezing, pleuritic pain    Cardiology:   chest pain, palpitations, orthopnea, edema, syncope    Gastrointestinal:  abdominal pain , N/V, diarrhea, dysphagia, constipation, bleeding    Genitourinary:  frequency, urgency, dysuria, hematuria, incontinence    Muskuloskeletal:  arthralgia, myalgia, back pain   Hematology:  easy bruising, nose or gum bleeding, lymphadenopathy    Dermatological: rash, ulceration, pruritis, color change / jaundice   Endocrine:  hot flashes or polydipsia    Neurological:  headache, dizziness, confusion, focal weakness, paresthesia, Speech difficulties, memory loss, gait difficulty   Psychological: Feelings of anxiety, depression, agitation       Objective:   VITALS:    Visit Vitals  BP (!) 190/155   Pulse 99   Temp 98.2 °F (36.8 °C)   Resp 20   Ht 6' (1.829 m)   Wt (!) 160.9 kg (354 lb 11.5 oz)   SpO2 94%   BMI 48.11 kg/m²       PHYSICAL EXAM:    General:   Alert, cooperative, no distress, morbidly obese, well appearing, middle-aged -American male        HEENT:  Atraumatic, anicteric sclerae, pink conjunctivae, mucosa moist, dentition fair     Neck:  Supple, symmetrical, trachea midline, no abnormalities on palpation     Lungs:   CTAB. Symmetric expansion. Good aeration. Normal respiratory effort. Chest wall:  No tenderness. No Accessory muscle use. Cardiovascular:   RRR, No murmur/rub/gallop. Unable to appreciate JVD due to body habitus. Radial/AT/DP pulse 2+     GI/:   Obese/protuberant, soft, non-tender. Not distended. Bowel sounds normal. No HSM     Extremities Moderate pitting edema BLE to level of thighs. No cyanosis. Capillary refill normal     Skin: No significant lesions noted. Not Jaundiced. No rashes      Neurologic: PERRL. EOMI. No facial asymmetry. No aphasia or slurred speech. Moves all extremities. Sensation to light touch grossly intact BUE/BLE. No overt focal deficits appreciated     Psych:  Alert and oriented X 4. Normal affect.  Good insight     Other:   N/A LAB DATA REVIEWED:    Recent Results (from the past 24 hour(s))   EKG, 12 LEAD, INITIAL    Collection Time: 09/26/22  7:17 PM   Result Value Ref Range    Ventricular Rate 108 BPM    Atrial Rate 108 BPM    P-R Interval 148 ms    QRS Duration 94 ms    Q-T Interval 352 ms    QTC Calculation (Bezet) 471 ms    Calculated P Axis 55 degrees    Calculated R Axis -45 degrees    Calculated T Axis 84 degrees    Diagnosis       Sinus tachycardia  Biatrial enlargement  Left axis deviation  ST & T wave abnormality, consider lateral ischemia  When compared with ECG of 04-FEB-2019 04:11,  Nonspecific T wave abnormality no longer evident in Inferior leads     CBC WITH AUTOMATED DIFF    Collection Time: 09/26/22  7:18 PM   Result Value Ref Range    WBC 14.7 (H) 4.1 - 11.1 K/uL    RBC 4.80 4. 10 - 5.70 M/uL    HGB 14.2 12.1 - 17.0 g/dL    HCT 45.5 36.6 - 50.3 %    MCV 94.8 80.0 - 99.0 FL    MCH 29.6 26.0 - 34.0 PG    MCHC 31.2 30.0 - 36.5 g/dL    RDW 14.6 (H) 11.5 - 14.5 %    PLATELET 913 758 - 401 K/uL    MPV 10.1 8.9 - 12.9 FL    NRBC 0.0 0  WBC    ABSOLUTE NRBC 0.00 0.00 - 0.01 K/uL    NEUTROPHILS 73 32 - 75 %    LYMPHOCYTES 17 12 - 49 %    MONOCYTES 9 5 - 13 %    EOSINOPHILS 1 0 - 7 %    BASOPHILS 0 0 - 1 %    IMMATURE GRANULOCYTES 0 0.0 - 0.5 %    ABS. NEUTROPHILS 10.6 (H) 1.8 - 8.0 K/UL    ABS. LYMPHOCYTES 2.6 0.8 - 3.5 K/UL    ABS. MONOCYTES 1.3 (H) 0.0 - 1.0 K/UL    ABS. EOSINOPHILS 0.2 0.0 - 0.4 K/UL    ABS. BASOPHILS 0.1 0.0 - 0.1 K/UL    ABS. IMM.  GRANS. 0.0 0.00 - 0.04 K/UL    DF AUTOMATED     METABOLIC PANEL, COMPREHENSIVE    Collection Time: 09/26/22  7:18 PM   Result Value Ref Range    Sodium 143 136 - 145 mmol/L    Potassium 4.5 3.5 - 5.1 mmol/L    Chloride 107 97 - 108 mmol/L    CO2 31 21 - 32 mmol/L    Anion gap 5 5 - 15 mmol/L    Glucose 115 (H) 65 - 100 mg/dL    BUN 23 (H) 6 - 20 MG/DL    Creatinine 1.65 (H) 0.70 - 1.30 MG/DL    BUN/Creatinine ratio 14 12 - 20      GFR est AA 56 (L) >60 ml/min/1.73m2 GFR est non-AA 46 (L) >60 ml/min/1.73m2    Calcium 8.4 (L) 8.5 - 10.1 MG/DL    Bilirubin, total 0.6 0.2 - 1.0 MG/DL    ALT (SGPT) 24 12 - 78 U/L    AST (SGOT) 23 15 - 37 U/L    Alk.  phosphatase 81 45 - 117 U/L    Protein, total 6.9 6.4 - 8.2 g/dL    Albumin 2.6 (L) 3.5 - 5.0 g/dL    Globulin 4.3 (H) 2.0 - 4.0 g/dL    A-G Ratio 0.6 (L) 1.1 - 2.2     TROPONIN-HIGH SENSITIVITY    Collection Time: 09/26/22  7:18 PM   Result Value Ref Range    Troponin-High Sensitivity 320 (HH) 0 - 76 ng/L   NT-PRO BNP    Collection Time: 09/26/22  7:18 PM   Result Value Ref Range    NT pro-BNP 7,237 (H) <125 PG/ML   TROPONIN-HIGH SENSITIVITY    Collection Time: 09/26/22  9:30 PM   Result Value Ref Range    Troponin-High Sensitivity 317 (HH) 0 - 76 ng/L       IMAGING:  CXR-Marked cardiomegaly    EKG: Sinus tachycardia, rate 108, , QTc 471, lateral ST inversions without definitive ischemic changes  ______________________________________________________________________    Assessment / Plan:  Suspected acute on chronic HFrEF (EF 20-25% 2019)  Elevated troponin-suspect type II MI  Essential hypertension  HAZEL  Leukocytosis, suspect reactive  Former smoker  Daily marijuana use  Obesity class III by BMI-48.1    PLAN:    Suspected acute on chronic HFrEF (EF 20-25% 2019)  Elevated troponin-suspect type II MI  Essential hypertension  Admit to telemetry monitoring  Cardiology consulted, greatly appreciate their expertise  Repeat TTE to evaluate heart function  -If EF remains less than 30 may need to consider ICD placement-defer to cardiology expertise  Twice daily Lasix-good response to 40 mg we will continue at this dose  -Follow BMP and replete lites as needed  Strict I's and O's  Daily weights  Patient unsure of PTA spironolactone dose will start at 25 mg daily  Increase PTA Coreg to 12.5  As needed IV hydralazine to maintain SBP less than 180-we will titrate GDMT slowly thereafter to optimize therapy, but will maintain SBP around 180 for the next 8-12 hours as we are unsure how long he has been hypertensive  Counseled on smoking including marijuana cessation  Continue PTA aspirin  Start rosuvastatin 20 mg daily  Labs: Lipids, TSH, hemoglobin A1c  Troponin plateaued and patient denies any chest pain-we will continue to monitor for chest pain at some point to rule out ischemic cardiomyopathy superimposed on hypertensive heart disease    HAZEL  Suspect prerenal secondary to congestive nephropathy-monitor renal function with diuresis    Leukocytosis, suspect reactive  Trend WBC    Former smoker  Daily marijuana use  Celebrated patient successful tobacco cessation. Educated on adverse health effects of marijuana use and recommended cessation, which patient reports he will pursue    Obesity class III by BMI-48.1  Recommend weight loss counseling and medical assisted weight loss-defer to PCP we will have better rapport and ability to follow this    Code Status: Full    DVT Prophylaxis: Lovenox  GI Prophylaxis: Not indicated  Diet: Cardiac       Care Plan discussed with:    Comments   Patient x    Family      RN     Care Manager                    Consultant:      _______________________________________________________________________  Baseline Level of Function: Independent    Expected  Disposition:   Home with Family x   HH/PT/OT/RN    SNF/LTC    JOHNATHAN    ________________________________________________________________________  TOTAL TIME:  72 Minutes      Comments    x Reviewed previous records   >50% of visit spent in counseling and coordination of care x Discussion with patient and/or family and questions answered       ________________________________________________________________________  Signed: Naveen Redmond DO  9/26/2022 11:35 PM    Please note that this documentation was completed in part with Dragon dictation software.  Occasionally unanticipated grammatical, syntax, homophones, and other interpretive errors are inadvertently transcribed by the computer software. Please excuse and disregard any errors that have escaped final proofreading. If in doubt, please do not hesitate to reach out to myself or the assigned hospitalist via [facility] paging system for clarification.

## 2022-09-27 NOTE — CONSULTS
Cardiology Consult Note    CC: Dyspnea    PCP:Monie South MD  Reason for consult: CHF; CM  Requesting MD:  Dr. Viridiana Dominguez Date: 9/26/2022   Today's Date: 9/27/2022   Cardiologist:  Dr Kimberlyn Joe, last seen in 2020; didn't get F/U echo nor OV rec then. Cardiac Assessment/Plan:   1) CM/CHF: acute sys/diast CHF admit 2/2019; Echo 2/5/19: Ef 20-25%, Mild MR; Mild-mod TR; PAp 35;  Likely related to HTN; Nl TSH/ferritin; Neg HIV; wt from 315 to 296;     2) Elevated troponin 2/2019: likely related to CM; Cath if EF not improved with med Rx; if improves, outpt MPI; no angina. 3) ECG c/w LVH/repolarization changes. 4) HTN  5) Cr 1.3 2/2019 but GFR >60.  6) ?ELE: + overnight pulse Ox;  formal outpt SS rec. 7) Lipids:  2/2019  8) Obesity: 296# 2019; 290# 2020.  9) tobacco use, quit 2/2019:  restarted 2020; quit 2022. Admitted w/ CHF. Echo pending. Current cardiac meds: asa; coreg 12.5 bid; lasix 40 IV bid; crestor 10; aldactone 25. Rec:  Cont diuresis; add ARB when back to PO lasix. NTG paste PRN for BP. TSH per primary team. Still needs outpt SS. Hospital Problem List:  Active Problems:    Acute HFrEF (heart failure with reduced ejection fraction) ([facility] (9/26/2022)       ____________________________________________________________________  Dion Jerome is a 37 y.o. male presents with Acute HFrEF (heart failure with reduced ejection fraction) ([facility] [address] [I50.21]. As noted in H&P: \"37 y.o.  male with pertinent past medical history of essential hypertension, former tobacco use (quit 3 months prior), daily marijuana use, and HFrEF (EF 20-25% in 2019) lost to cardiology follow-up who presents with complaints of bilateral lower extremity swelling for the past 2-3 weeks now involving abdominal swelling and mild shortness of breath with orthopnea.   Patient reports no memory of CHF diagnosis and states he was going through a bad divorce at that time and did not follow-up with cardiology as he was directed. He has recently been switched off of his Lasix which was replaced with spironolactone. He denies any other associated symptoms and ROS otherwise negative. He has been referred for sleep study, but has not scheduled the appointment. In the ED, patient afebrile, borderline tachycardic with heart rates around 100 bpm hypertensive 190s/140s saturating mid 90s on 2 L/min by nasal cannula. ECG demonstrates sinus tachycardia with evidence of biatrial enlargement and left axis deviation without definitive ischemic changes (lateral ST changes noted). CXR demonstrates marked cardiomegaly. Labs demonstrate: Stable troponin 320-> 317, sodium 143, potassium 4.5, BUN 23, creatinine 1.65 (baseline 1.1-1.3), proBNP 7237 (similar to prior admission for CHF), WBC 14.7, hemoglobin 14.2, platelets 258. \"    ______________________________________________________________________    The patient reports no chest pain/pressure; Worse LE edema then MARSHALL/orthpnea for last few weeks. Some sodium in diet; Occ forgets to take meds (few doses/week). No PND, orthopnea, palpitations, pre-syncope, syncope, peripheral edema, or decrease in exercise tolerance. No current complaints. ECG: sinus tachy; LAD; LVH w/ repol changes;  Tele: sinus  CXR: \"The lungs are clear. Marked cardiomegaly. \"    Notable labs: WBC 13; Hg 16; Cr 1.57 (gfr 60) from 1.65; LDL 77; trop 300s; proBNP 7k; TSH 4.3;   ______________________________________________________  Notable prior cardiac history:  @ OV 7/17/20:   1) CM/CHF: acute sys/diast CHF admit 2/2019; Echo 2/5/19: Ef 20-25%, Mild MR; Mild-mod TR; PAp 35;  Likely related to HTN; Nl TSH/ferritin; Neg HIV; wt from 315 to 296;     2) Elevated troponin 2/2019: likely related to CM; Cath if EF not improved with med Rx; if improves, outpt MPI; no angina. 3) ECG c/w LVH/repolarization changes. 4) HTN  5) Cr 1.3 2/2019 but GFR >60.  6) ?ELE: + overnight pulse Ox;  formal outpt SS rec.   7) Lipids:  2/2019  8) Obesity: 296# 2019; 290# 2020.  9) tobacco use, quit 2/2019:  restarted 2020. 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 Occ added salt; Rare EtOH.    2/2019:  Since being discharged, he stop smoking; he has had no chest pain. His MARSHALL has resolved. He still needs to schedule a sleep study but is planning to do so. He is back at work. 7/2020:  Since his last visit, he did not have the recommended blood work, echocardiogram, nor sleep study. He has started smoking again. His Lasix ran out; he is taking Coreg 12.5 bid but occasionally runs out. He took his BP meds today. Finances have been an issue. No chest pain, orthopnea or LE edema. No MARSHALL except in humidity. IMPRESSION AND PLAN  01. Other cardiomyopathies (I42.8):  Probable hypertensive CM; again needs follow-up echo; if not improved, CTA vs cath. If improves or normalizes, MPI. May need ICD. We discussed the signs and symptoms of unstable angina, myocardial infarction and malignant arrhythmia. The patient knows to seek immediate medical attention should they occur. ECG done 2-D w/CFD Echo to be done. first available   02. Chronic combined systolic (congestive) and diastolic (congestive) heart failure (I50.42):  He appears to be euvolemic. He needs the follow-up labs that were previously ordered. 03. Hypertensive heart disease with heart failure (I11.0): Inadequately controlled. Coreg increased to 25 bid & lisinopril to 40 qday; Add aldactone 25 qday; NP visit in 2 weeks; if BP not normalized & Cr ok, lisinopril to 40 bid. Follow-up labs needed if ACEi or diuretics changed. See Order Plan Return for follow-up with Nurse Practitioner in two weeks. .  The patient was instructed on a low sodium diet. 04. Sleep apnea, unspecified (G47.30):  Still needs a formal sleep study & likely treatment. NPSG (Diagnostic Overnight Sleep Study) to be done   05. Personal hx of nicotine dependence (Z87.891):   He was previously abstinent, but has resumed tobacco use. The patient was instructed on tobacco cessation. 06. Body mass index (BMI) 38.0-38.9, adult (H66.09): Improved since last seen. The patient was instructed on AHA diet and regular exercise. ORDERS:  1 ECG done   2 Dietary management education, guidance, and counseling   3 Return for follow-up with Nurse Practitioner in two weeks. 4 NPSG (Diagnostic Overnight Sleep Study). 5 2-D w/ CFD Echocardiogram   6 Have a CMP (Comprehensive Metabolic Panel) drawn at the first available time. 7 Return office visit with Jorge Fofana MD in 3 Months. 8 The patient was instructed on AHA diet and regular exercise. 9 Patient counseled on the following: Low Salt Diet. 10 Patient counseled on the following: Tobacco Cessation. 11 Tobacco cessation counseling       7/17/22 MEDICATION LIST  Medication Sig Desc   Aspir-81 81 mg tablet,delayed release take 1 tablet by oral route  every day   carvedilol 25 mg tablet take 1 tablet by oral route 2 times every day with food   lisinopril 40 mg tablet take 1 tablet by oral route  every day   spironolactone 25 mg tablet take 1 Tablet by oral route  every day         _________________________________________________________  CARDIAC HISTORY  CHF/CM:  1 Probable hypertensive Cardiomyopathy [Normal TSH/ferritin; negative HIV.] - 2/2019     RISK FACTORS:  1 Hypertension   2 Tobacco Use       CARDIOVASCULAR PROCEDURES  Procedure Date Results   Echo 02/05/2019 EF 0.20 (20%), Mild-Moderate TR, Mild MR, Est RVSP 35 mmHg   EKG 07/17/2020 Sinus Rhythm, PRWP.  ., LVH       ACTIVE ALLERGIES:  Ingredient Reaction Comment   NO KNOWN ALLERGIES       None    PROBLEM LIST:  Problem Description Chronic   Probable hypertensive Cardiomyopathy N       PAST MEDICAL/SURGICAL HISTORY  (Detailed)      Family History  (Detailed)    SOCIAL HISTORY  (Detailed)  Tobacco use reviewed. Preferred language is [place].     Smoking status: Former smoker. SMOKING STATUS  Use Status Type Smoking Status Usage Per Day Years Used Total Pack Years   yes  Former smoker        TOBACCO CESSATION INFORMATION  Date Counseled By Order Status Description Code Tobacco Cessation Information   2020 Danielle Patel Tobacco cessation counseling completed   Tobacco Cessation Counseling             ______________________________________________________________________  Patient Active Problem List    Diagnosis Date Noted    Acute HFrEF (heart failure with reduced ejection fraction) ([facility] [address] 2022    Cardiomyopathy ([facility] 2019        Past Medical History:   Diagnosis Date    Hypertension     Infectious disease     trichomonas       No past surgical history on file. No Known Allergies   No family history on file.    Social History     Socioeconomic History    Marital status: LEGALLY      Spouse name: Not on file    Number of children: Not on file    Years of education: Not on file    Highest education level: Not on file   Occupational History    Not on file   Tobacco Use    Smoking status: Former     Packs/day: 0.50     Types: Cigarettes     Quit date: 6/15/2014     Years since quittin.2    Smokeless tobacco: Not on file   Substance and Sexual Activity    Alcohol use: Yes     Comment: socially    Drug use: No    Sexual activity: Not on file   Other Topics Concern    Not on file   Social History Narrative    Not on file     Social Determinants of Health     Financial Resource Strain: Not on file   Food Insecurity: Not on file   Transportation Needs: Not on file   Physical Activity: Not on file   Stress: Not on file   Social Connections: Not on file   Intimate Partner Violence: Not on file   Housing Stability: Not on file     Current Facility-Administered Medications   Medication Dose Route Frequency    cloNIDine HCL (CATAPRES) tablet 0.1 mg  0.1 mg Oral Q2H PRN    labetaloL (NORMODYNE;TRANDATE) injection 10 mg  10 mg IntraVENous Q4H PRN    hydrALAZINE (APRESOLINE) 20 mg/mL injection 10 mg  10 mg IntraVENous Q4H PRN    aspirin delayed-release tablet 81 mg  81 mg Oral DAILY    spironolactone (ALDACTONE) tablet 25 mg  25 mg Oral DAILY    furosemide (LASIX) injection 40 mg  40 mg IntraVENous Q12H    rosuvastatin (CRESTOR) tablet 20 mg  20 mg Oral QHS    sodium chloride (NS) flush 5-40 mL  5-40 mL IntraVENous Q8H    sodium chloride (NS) flush 5-40 mL  5-40 mL IntraVENous PRN    acetaminophen (TYLENOL) tablet 650 mg  650 mg Oral Q6H PRN    Or    acetaminophen (TYLENOL) suppository 650 mg  650 mg Rectal Q6H PRN    polyethylene glycol (MIRALAX) packet 17 g  17 g Oral DAILY PRN    ondansetron (ZOFRAN ODT) tablet 4 mg  4 mg Oral Q8H PRN    Or    ondansetron (ZOFRAN) injection 4 mg  4 mg IntraVENous Q6H PRN    enoxaparin (LOVENOX) injection 40 mg  40 mg SubCUTAneous Q12H    carvediloL (COREG) tablet 12.5 mg  12.5 mg Oral BID WITH MEALS        No reported FHx of early CAD or SCD    Prior to Admission Medications:  Prior to Admission medications    Medication Sig Start Date End Date Taking? Authorizing Provider   spironolactone (ALDACTONE) 25 mg tablet Take 25 mg by mouth two (2) times a day. Yes Provider, Historical   aspirin delayed-release 81 mg tablet Take 1 Tab by mouth daily. 2/5/19  Yes Js Ospina MD   carvedilol (COREG) 6.25 mg tablet Take 1 Tab by mouth two (2) times daily (with meals). 2/5/19  Yes Js Ospina MD   lisinopril (PRINIVIL, ZESTRIL) 20 mg tablet Take 1 Tab by mouth daily.  2/5/19  Yes Js Ospina MD   carvediloL (COREG) 25 mg tablet  9/22/22   Provider, Historical   lisinopriL (PRINIVIL, ZESTRIL) 40 mg tablet  9/22/22   Provider, Historical        Review of Symptoms: As noted in H&P:  \"Total of 12 systems reviewed as follows:                                           POSITIVE= Red text   General: Fever, chills, sweats, weakness/malaise, weight loss/gain, loss of appetite    Eyes:             Vision change, eye pain   ENT: Rhinorrhea, pharyngitis, Hearing loss    Respiratory:              cough, sputum production, SOB, MARSHALL, wheezing, pleuritic pain    Cardiology:              chest pain, palpitations, orthopnea, edema, syncope    Gastrointestinal:  abdominal pain , N/V, diarrhea, dysphagia, constipation, bleeding    Genitourinary:  frequency, urgency, dysuria, hematuria, incontinence    Muskuloskeletal:  arthralgia, myalgia, back pain   Hematology:              easy bruising, nose or gum bleeding, lymphadenopathy    Dermatological: rash, ulceration, pruritis, color change / jaundice   Endocrine:  hot flashes or polydipsia    Neurological:  headache, dizziness, confusion, focal weakness, paresthesia, Speech difficulties, memory loss, gait difficulty   Psychological: Feelings of anxiety, depression, agitation    \".      24 hr VS reviewed, overall VSSAF  Temp (24hrs), Av °F (36.7 °C), Min:97.9 °F (36.6 °C), Max:98.2 °F (36.8 °C)    Patient Vitals for the past 8 hrs:   Pulse   22 0715 88   22 0630 88   22 0600 96   22 0530 88   22 0500 83   22 0435 78   22 0400 (!) 112   22 0337 (!) 108   22 0300 (!) 105   22 0245 95   22 0230 99   22 0215 95   22 0200 97   22 0145 90   22 0130 94   22 0115 87   22 0100 91     Patient Vitals for the past 8 hrs:   Resp   22 0715 28   22 0630 26   22 0600 29   22 0530 17   22 0500 20   22 0435 24   22 0400 24   22 0337 30   22 0300 28   22 0245 27   22 0230 (!) 43   22 0215 (!) 36   22 0200 (!) 43   22 0145 25   22 0130 30   22 0115 29   22 0100 (!) 33     Patient Vitals for the past 8 hrs:   BP   22 0715 (!) 147/94   22 0630 (!) 175/125   22 0600 (!) 172/124   22 0530 (!) 157/109   22 0500 (!) 164/111   22 0435 (!) 166/117   22 0400 (!) 192/114   22 3168 (!) 209/138   09/27/22 0300 (!) 198/109   09/27/22 0245 (!) 189/136   09/27/22 0230 (!) 183/134   09/27/22 0215 (!) 194/125   09/27/22 0200 (!) 184/123   09/27/22 0145 (!) 173/119   09/27/22 0130 (!) 172/137   09/27/22 0115 (!) 187/135   09/27/22 0100 (!) 187/151       Intake/Output Summary (Last 24 hours) at 9/27/2022 0852  Last data filed at 9/27/2022 0630  Gross per 24 hour   Intake 100 ml   Output 7780 ml   Net -7680 ml         Physical Exam (complete single organ system exam)    Cons: The patient is no distress. Appears stated age. HEENT: Normal conjunctivae and palate. No xanthelasma. Neck: Flat JVP without appreciable HJR. Resp: Normal respiratory effort with clear lungs bilaterally. CV: Regular rate and rhythm. PMI not palpated. Normal S1,S2  No gallop or rubs appreciated. Soft holosystolic murmur appreciated. Intact carotid upstroke bilaterally without appreciated bruits. Abdominal aorta not palpated; no abdominal bruit noted. Decreased pedal pulses. Mod peripheral edema. GI: No abd mass noted, soft; no organomegaly noted. Bowel sounds present. Muscular:  No significant kyphosis. Strength WNL for age. Ext: No cyanosis, clubbing, or stigmata of peripheral embolization. Derm: No ulcers; + stasis dermatitis of lower extremities. Neuro: Alert and oriented x 3;  Grossly non-focal. Normal mood and affect.      Labs:   Recent Results (from the past 24 hour(s))   EKG, 12 LEAD, INITIAL    Collection Time: 09/26/22  7:17 PM   Result Value Ref Range    Ventricular Rate 108 BPM    Atrial Rate 108 BPM    P-R Interval 148 ms    QRS Duration 94 ms    Q-T Interval 352 ms    QTC Calculation (Bezet) 471 ms    Calculated P Axis 55 degrees    Calculated R Axis -45 degrees    Calculated T Axis 84 degrees    Diagnosis       Sinus tachycardia  Biatrial enlargement  Left axis deviation  ST & T wave abnormality, consider lateral ischemia  When compared with ECG of 04-FEB-2019 04:11,  Nonspecific T wave abnormality no longer evident in Inferior leads     CBC WITH AUTOMATED DIFF    Collection Time: 09/26/22  7:18 PM   Result Value Ref Range    WBC 14.7 (H) 4.1 - 11.1 K/uL    RBC 4.80 4. 10 - 5.70 M/uL    HGB 14.2 12.1 - 17.0 g/dL    HCT 45.5 36.6 - 50.3 %    MCV 94.8 80.0 - 99.0 FL    MCH 29.6 26.0 - 34.0 PG    MCHC 31.2 30.0 - 36.5 g/dL    RDW 14.6 (H) 11.5 - 14.5 %    PLATELET 084 123 - 632 K/uL    MPV 10.1 8.9 - 12.9 FL    NRBC 0.0 0  WBC    ABSOLUTE NRBC 0.00 0.00 - 0.01 K/uL    NEUTROPHILS 73 32 - 75 %    LYMPHOCYTES 17 12 - 49 %    MONOCYTES 9 5 - 13 %    EOSINOPHILS 1 0 - 7 %    BASOPHILS 0 0 - 1 %    IMMATURE GRANULOCYTES 0 0.0 - 0.5 %    ABS. NEUTROPHILS 10.6 (H) 1.8 - 8.0 K/UL    ABS. LYMPHOCYTES 2.6 0.8 - 3.5 K/UL    ABS. MONOCYTES 1.3 (H) 0.0 - 1.0 K/UL    ABS. EOSINOPHILS 0.2 0.0 - 0.4 K/UL    ABS. BASOPHILS 0.1 0.0 - 0.1 K/UL    ABS. IMM. GRANS. 0.0 0.00 - 0.04 K/UL    DF AUTOMATED     METABOLIC PANEL, COMPREHENSIVE    Collection Time: 09/26/22  7:18 PM   Result Value Ref Range    Sodium 143 136 - 145 mmol/L    Potassium 4.5 3.5 - 5.1 mmol/L    Chloride 107 97 - 108 mmol/L    CO2 31 21 - 32 mmol/L    Anion gap 5 5 - 15 mmol/L    Glucose 115 (H) 65 - 100 mg/dL    BUN 23 (H) 6 - 20 MG/DL    Creatinine 1.65 (H) 0.70 - 1.30 MG/DL    BUN/Creatinine ratio 14 12 - 20      GFR est AA 56 (L) >60 ml/min/1.73m2    GFR est non-AA 46 (L) >60 ml/min/1.73m2    Calcium 8.4 (L) 8.5 - 10.1 MG/DL    Bilirubin, total 0.6 0.2 - 1.0 MG/DL    ALT (SGPT) 24 12 - 78 U/L    AST (SGOT) 23 15 - 37 U/L    Alk.  phosphatase 81 45 - 117 U/L    Protein, total 6.9 6.4 - 8.2 g/dL    Albumin 2.6 (L) 3.5 - 5.0 g/dL    Globulin 4.3 (H) 2.0 - 4.0 g/dL    A-G Ratio 0.6 (L) 1.1 - 2.2     TROPONIN-HIGH SENSITIVITY    Collection Time: 09/26/22  7:18 PM   Result Value Ref Range    Troponin-High Sensitivity 320 (HH) 0 - 76 ng/L   NT-PRO BNP    Collection Time: 09/26/22  7:18 PM   Result Value Ref Range    NT pro-BNP 7,237 (H) <125 PG/ML TROPONIN-HIGH SENSITIVITY    Collection Time: 09/26/22  9:30 PM   Result Value Ref Range    Troponin-High Sensitivity 317 (HH) 0 - 76 ng/L   HEMOGLOBIN A1C WITH EAG    Collection Time: 09/27/22  1:24 AM   Result Value Ref Range    Hemoglobin A1c 6.0 (H) 4.0 - 5.6 %    Est. average glucose 126 mg/dL   TSH 3RD GENERATION    Collection Time: 09/27/22  1:24 AM   Result Value Ref Range    TSH 4.27 (H) 0.36 - 3.74 uIU/mL   MAGNESIUM    Collection Time: 09/27/22  1:24 AM   Result Value Ref Range    Magnesium 1.8 1.6 - 2.4 mg/dL   LIPID PANEL    Collection Time: 09/27/22  1:24 AM   Result Value Ref Range    Cholesterol, total 126 <200 MG/DL    Triglyceride 114 <150 MG/DL    HDL Cholesterol 26 MG/DL    LDL, calculated 77.2 0 - 100 MG/DL    VLDL, calculated 22.8 MG/DL    CHOL/HDL Ratio 4.8 0.0 - 5.0     METABOLIC PANEL, BASIC    Collection Time: 09/27/22  5:33 AM   Result Value Ref Range    Sodium 139 136 - 145 mmol/L    Potassium 3.9 3.5 - 5.1 mmol/L    Chloride 103 97 - 108 mmol/L    CO2 33 (H) 21 - 32 mmol/L    Anion gap 3 (L) 5 - 15 mmol/L    Glucose 108 (H) 65 - 100 mg/dL    BUN 20 6 - 20 MG/DL    Creatinine 1.57 (H) 0.70 - 1.30 MG/DL    BUN/Creatinine ratio 13 12 - 20      GFR est AA 60 (L) >60 ml/min/1.73m2    GFR est non-AA 49 (L) >60 ml/min/1.73m2    Calcium 8.6 8.5 - 10.1 MG/DL   CBC WITH AUTOMATED DIFF    Collection Time: 09/27/22  5:33 AM   Result Value Ref Range    WBC 13.1 (H) 4.1 - 11.1 K/uL    RBC 5.44 4.10 - 5.70 M/uL    HGB 15.9 12.1 - 17.0 g/dL    HCT 51.1 (H) 36.6 - 50.3 %    MCV 93.9 80.0 - 99.0 FL    MCH 29.2 26.0 - 34.0 PG    MCHC 31.1 30.0 - 36.5 g/dL    RDW 14.8 (H) 11.5 - 14.5 %    PLATELET 943 973 - 322 K/uL    MPV 9.8 8.9 - 12.9 FL    NRBC 0.0 0  WBC    ABSOLUTE NRBC 0.00 0.00 - 0.01 K/uL    NEUTROPHILS 78 (H) 32 - 75 %    LYMPHOCYTES 12 12 - 49 %    MONOCYTES 8 5 - 13 %    EOSINOPHILS 1 0 - 7 %    BASOPHILS 1 0 - 1 %    IMMATURE GRANULOCYTES 0 0.0 - 0.5 %    ABS.  NEUTROPHILS 10.3 (H) 1.8 - 8.0 K/UL    ABS. LYMPHOCYTES 1.6 0.8 - 3.5 K/UL    ABS. MONOCYTES 1.1 (H) 0.0 - 1.0 K/UL    ABS. EOSINOPHILS 0.1 0.0 - 0.4 K/UL    ABS. BASOPHILS 0.1 0.0 - 0.1 K/UL    ABS. IMM. GRANS. 0.0 0.00 - 0.04 K/UL    DF AUTOMATED     MAGNESIUM    Collection Time: 09/27/22  5:33 AM   Result Value Ref Range    Magnesium 2.1 1.6 - 2.4 mg/dL     No results for input(s): CPK, CKMB, CKNDX, TROIQ in the last 72 hours.     Recent Labs     09/27/22  0533 09/26/22  1918    143   K 3.9 4.5    107   CO2 33* 31   BUN 20 23*   CREA 1.57* 1.65*   GFRAA 60* 56*   * 115*   CA 8.6 8.4*   ALB  --  2.6*   WBC 13.1* 14.7*   HGB 15.9 14.2   HCT 51.1* 45.5    348       Edmundo Flores MD

## 2022-09-27 NOTE — PROGRESS NOTES
Reason for Admission:  Acute HFrEF                     RUR Score:  9%                   Plan for utilizing home health:  Declines        PCP: First and Last name:  Juan Pabol Leonard MD     Name of Practice:    Are you a current patient: Yes/No:    Approximate date of last visit: Unsure of last visit   Can you participate in a virtual visit with your PCP:                     Current Advanced Directive/Advance Care Plan: Full Code  CM confirmed with patient's wife that he is a Full Code    Healthcare Decision Maker:   Click here to complete [address] including selection of the Healthcare Decision Maker Relationship (ie \"Primary\")           Wife, Domonique Reyna, [phone]                  Transition of Care Plan:                    CM spoke with patient's wife over the phone. Patient lives at home with his wife and their three children. Patient uses no DME and is independent in care. Patient's wife will be his transport and he uses the [address] on [address] [facility]. Current Dispo: Home/self.

## 2022-09-27 NOTE — ED PROVIDER NOTES
EMERGENCY DEPARTMENT HISTORY AND PHYSICAL EXAM      Date: 9/26/2022  Patient Name: Ricco Ramirez  Patient Age and Sex: 37 y.o. male     History of Presenting Illness     Chief Complaint   Patient presents with    Shortness of Breath     Patient stated that he started having swelling in his legs and feet, then it progressed to hypertension and shortness of breath which is why he is here. Patient denies any history of asthma, CHF or COPD. Patient has a dry non-productive cough. Breathing when trying to sleep is the hardest per the patient. Denies any fevers or chills. History Provided By: Patient    HPI: Ricco Ramirez is a 43-year-old history HTN prsenting with multiple symptoms. He reprots over the past 2 weeks he has had bilateral lower extremity swelling, abdominal swelling, decreased appetite and dyspnea. Dyspnea is worse with lying flat and with exertion. He is taking his prescribed BP medications. No known history of heart failure. There are no other complaints, changes, or physical findings at this time. PCP: Naty Valles MD    No current facility-administered medications on file prior to encounter. Current Outpatient Medications on File Prior to Encounter   Medication Sig Dispense Refill    ibuprofen (MOTRIN) 600 mg tablet Take 1 Tablet by mouth every six (6) hours as needed for Pain. 20 Tablet 0    aspirin delayed-release 81 mg tablet Take 1 Tab by mouth daily. 30 Tab 12    carvedilol (COREG) 6.25 mg tablet Take 1 Tab by mouth two (2) times daily (with meals). 60 Tab 12    furosemide (LASIX) 40 mg tablet 30 30 Tab 12    lisinopril (PRINIVIL, ZESTRIL) 20 mg tablet Take 1 Tab by mouth daily. 30 Tab 12    potassium chloride SR (KLOR-CON 10) 10 mEq tablet Take 1 Tab by mouth daily.  30 Tab 12       Past History   Past Medical History:  Past Medical History:   Diagnosis Date    Hypertension     Infectious disease     trichomonas      Past Surgical History:  No past surgical history on file.    Family History:  No family history on file. Social History:  Social History     Tobacco Use    Smoking status: Former     Packs/day: 0.50     Types: Cigarettes     Quit date: 6/15/2014     Years since quittin.2   Substance Use Topics    Alcohol use: Yes     Comment: socially    Drug use: No       Allergies:  No Known Allergies      Review of Systems   Review of Systems   Constitutional:  Negative for chills and fever. HENT:  Negative for congestion and rhinorrhea. Respiratory:  Positive for shortness of breath. Cardiovascular:  Positive for leg swelling. Negative for chest pain. Gastrointestinal:  Positive for abdominal distention. Negative for abdominal pain, diarrhea, nausea and vomiting. Genitourinary:  Negative for dysuria and frequency. Musculoskeletal:  Negative for myalgias. Skin:  Negative for rash. Neurological:  Negative for weakness and numbness. All other systems reviewed and are negative. Physical Exam   Physical Exam  Vitals and nursing note reviewed. Constitutional:       Appearance: He is obese. He is not ill-appearing. HENT:      Mouth/Throat:      Mouth: Mucous membranes are moist.   Eyes:      Conjunctiva/sclera: Conjunctivae normal.   Cardiovascular:      Rate and Rhythm: Normal rate and regular rhythm. Pulmonary:      Effort: Pulmonary effort is normal.      Breath sounds: Examination of the right-middle field reveals rales. Examination of the left-middle field reveals rales. Examination of the right-lower field reveals rales. Examination of the left-lower field reveals rales. Rales present. Abdominal:      General: Abdomen is flat. Tenderness: There is no abdominal tenderness. Comments: Abdominal distention   Musculoskeletal:         General: No deformity. Right lower leg: Edema present. Left lower leg: Edema present. Skin:     General: Skin is warm and dry.    Neurological:      Mental Status: He is alert and oriented to person, place, and time. Mental status is at baseline. Psychiatric:         Behavior: Behavior normal.         Thought Content: Thought content normal.        Diagnostic Study Results     Labs -     Recent Results (from the past 12 hour(s))   EKG, 12 LEAD, INITIAL    Collection Time: 09/26/22  7:17 PM   Result Value Ref Range    Ventricular Rate 108 BPM    Atrial Rate 108 BPM    P-R Interval 148 ms    QRS Duration 94 ms    Q-T Interval 352 ms    QTC Calculation (Bezet) 471 ms    Calculated P Axis 55 degrees    Calculated R Axis -45 degrees    Calculated T Axis 84 degrees    Diagnosis       Sinus tachycardia  Biatrial enlargement  Left axis deviation  ST & T wave abnormality, consider lateral ischemia  When compared with ECG of 04-FEB-2019 04:11,  Nonspecific T wave abnormality no longer evident in Inferior leads     CBC WITH AUTOMATED DIFF    Collection Time: 09/26/22  7:18 PM   Result Value Ref Range    WBC 14.7 (H) 4.1 - 11.1 K/uL    RBC 4.80 4. 10 - 5.70 M/uL    HGB 14.2 12.1 - 17.0 g/dL    HCT 45.5 36.6 - 50.3 %    MCV 94.8 80.0 - 99.0 FL    MCH 29.6 26.0 - 34.0 PG    MCHC 31.2 30.0 - 36.5 g/dL    RDW 14.6 (H) 11.5 - 14.5 %    PLATELET 332 847 - 491 K/uL    MPV 10.1 8.9 - 12.9 FL    NRBC 0.0 0  WBC    ABSOLUTE NRBC 0.00 0.00 - 0.01 K/uL    NEUTROPHILS 73 32 - 75 %    LYMPHOCYTES 17 12 - 49 %    MONOCYTES 9 5 - 13 %    EOSINOPHILS 1 0 - 7 %    BASOPHILS 0 0 - 1 %    IMMATURE GRANULOCYTES 0 0.0 - 0.5 %    ABS. NEUTROPHILS 10.6 (H) 1.8 - 8.0 K/UL    ABS. LYMPHOCYTES 2.6 0.8 - 3.5 K/UL    ABS. MONOCYTES 1.3 (H) 0.0 - 1.0 K/UL    ABS. EOSINOPHILS 0.2 0.0 - 0.4 K/UL    ABS. BASOPHILS 0.1 0.0 - 0.1 K/UL    ABS. IMM.  GRANS. 0.0 0.00 - 0.04 K/UL    DF AUTOMATED     METABOLIC PANEL, COMPREHENSIVE    Collection Time: 09/26/22  7:18 PM   Result Value Ref Range    Sodium 143 136 - 145 mmol/L    Potassium 4.5 3.5 - 5.1 mmol/L    Chloride 107 97 - 108 mmol/L    CO2 31 21 - 32 mmol/L    Anion gap 5 5 - 15 mmol/L Glucose 115 (H) 65 - 100 mg/dL    BUN 23 (H) 6 - 20 MG/DL    Creatinine 1.65 (H) 0.70 - 1.30 MG/DL    BUN/Creatinine ratio 14 12 - 20      GFR est AA 56 (L) >60 ml/min/1.73m2    GFR est non-AA 46 (L) >60 ml/min/1.73m2    Calcium 8.4 (L) 8.5 - 10.1 MG/DL    Bilirubin, total 0.6 0.2 - 1.0 MG/DL    ALT (SGPT) 24 12 - 78 U/L    AST (SGOT) 23 15 - 37 U/L    Alk. phosphatase 81 45 - 117 U/L    Protein, total 6.9 6.4 - 8.2 g/dL    Albumin 2.6 (L) 3.5 - 5.0 g/dL    Globulin 4.3 (H) 2.0 - 4.0 g/dL    A-G Ratio 0.6 (L) 1.1 - 2.2     TROPONIN-HIGH SENSITIVITY    Collection Time: 09/26/22  7:18 PM   Result Value Ref Range    Troponin-High Sensitivity 320 (HH) 0 - 76 ng/L   NT-PRO BNP    Collection Time: 09/26/22  7:18 PM   Result Value Ref Range    NT pro-BNP 7,237 (H) <125 PG/ML       Radiologic Studies -   XR CHEST PORT   Final Result   Marked cardiomegaly        CT Results  (Last 48 hours)      None          CXR Results  (Last 48 hours)                 09/26/22 2045  XR CHEST PORT Final result    Impression:  Marked cardiomegaly       Narrative:  EXAM: XR CHEST PORT       INDICATION: Shortness of breath       COMPARISON: 2/3/19. FINDINGS: A portable AP radiograph of the chest was obtained at 2030 hours. .   The lungs are clear. Marked cardiomegaly. The bones and soft tissues are   grossly within normal limits. Medical Decision Making   I am the first provider for this patient. I reviewed the vital signs, available nursing notes, past medical history, past surgical history, family history and social history. Vital Signs-Reviewed the patient's vital signs.   Patient Vitals for the past 12 hrs:   Temp Pulse Resp BP SpO2   09/26/22 2131 -- 99 -- (!) 190/155 --   09/26/22 1910 98.2 °F (36.8 °C) (!) 105 20 (!) 197/142 94 %       Records Reviewed: Nursing Notes and Old Medical Records    Provider Notes (Medical Decision Making):   Ddx acute heart fsialure, acute renal faiure, acute liver failure    HTN on arrival to 197 SBP, mildly tachycardic, Will obtain EKG, troponin to evaluate for ischemia, BNP. CXR, CBC, CMP. Fluid overloaded on exam, will give lasix, labetalol for  on my evaluation. ED Course:   Initial assessment performed. The patients presenting problems have been discussed, and they are in agreement with the care plan formulated and outlined with them. I have encouraged them to ask questions as they arise throughout their visit. ED Course as of 09/26/22 2208   Mon Sep 26, 2022   2057 EKG demonstrates sinus tachycardia at 1/1/2008, left axis, normal intervals, ST depressions V5 V6 with T wave inversions [WB]   2105 Chest x-ray shows marked cardiomegaly, elevated cardiac markers sensitive troponin 320 BNP 7200 mildly elevated creatinine 1.65 white count of 14.7 [WB]   2106 Hospitalist paged for admission [WB]   2111 Accepted by Dr Sanket Andres [WB]      ED Course User Index  [WB] Sravan Shafer MD     Disposition:  Admission Note:  Patient is being admitted to the hospital by Dr. Sanket Andres, Service: Hospitalist.  The results of their tests and reasons for their admission have been discussed with them and available family. They convey agreement and understanding for the need to be admitted and for their admission diagnosis. Diagnosis     Clinical Impression:   1. Acute heart failure, unspecified heart failure type (HCC)        Attestations:    William Herring M.D. Please note that this dictation was completed with [employer], the computer voice recognition software. Quite often unanticipated grammatical, syntax, homophones, and other interpretive errors are inadvertently transcribed by the computer software. Please disregard these errors. Please excuse any errors that have escaped final proofreading. Thank you.

## 2022-09-27 NOTE — ED NOTES
Assumed care of pt. Pt reports having lower bilat leg swelling for a week, denied any other s/s. While placing pt on cardiac monitor noted abdomen was very tight and swollen when asked pt about it and if was baseline, pt reported no-abdomen started swelling couple of days ago, pt denies any hx except hypertension. Pt AOX4, PWD, sinus tach and hypertensive on monitor, mid abdomen down to bilat feet severely swollen and tight, pt had also denied being short of air stating \"except for at times and not being able to lay flat but that is probably because I need a cpap at night\". Pt's girlfriend reported pt has had new shortness of breath, was unable to go to amusement park due to being short of air and legs swelling and she has noticed pt even at rest has been having trouble breathing.

## 2022-09-27 NOTE — PROGRESS NOTES
Received notification from bedside RN about patient with regards to: persistently elevated BP, needs additional PRN medication  VS: /114, , RR 24, O2 sat 94% on RA    Intervention given: Labetalol IV q 4 PRN, Hydralazine q 6 PRN frequency changed to q 4 PRN

## 2022-09-27 NOTE — PROGRESS NOTES
Hospitalist Progress Note    NAME: Iliana Mendez   :  1980   MRN:  [id number]       Assessment / Plan:  Acute on chronic Systolic CHF POA (EF 28-01% )  Elevated troponin-suspect type II MI POA- remains flat, no chest pain  Essential hypertension POA- uncontrolled    Can transfer off PCU to telemetry monitoring bed  IP Cardiology consulted- awaiting input, ?stress test   Check 2DEcho  -If EF remains less than 30 may need to consider ICD placement-defer to cardiology expertise  Cont IV Lasix 40mg BID  -Follow BMP and replete lites as needed  Strict I's and O's  Daily weights  Patient unsure of PTA spironolactone dose will started at 25 mg daily  Increase PTA Coreg to 25mg per recent Outside pharmacy records  As needed IV hydralazine & IV labetalol (added overnight)  Counseled on smoking including marijuana cessation  Continue PTA aspirin  Cont low dose statin started this admission  Troponin plateaued and patient denies any chest pain-we will continue to monitor for chest pain at some point to rule out ischemic cardiomyopathy superimposed on hypertensive heart disease     At risk for HAZEL POA- Cr improved to 1.5 today  Likely CKD stage 3-- baseline Cr ~1.3  Suspect prerenal secondary to congestive nephropathy-monitor renal function with diuresis     Leukocytosis, suspect reactive  Trend WBC- improved today      Former smoker  Daily marijuana use  Celebrated patient successful tobacco cessation. Educated on adverse health effects of marijuana use and recommended cessation, which patient reports he will pursue     40 or above Morbid obesity / Body mass index is 48.11 kg/m². Weight loss recommended, bariatric options recommended    Estimated discharge date: ? Barriers: cardiology clearance    Code status: Full  Prophylaxis: Lovenox  Recommended Disposition: Home w/Family     Subjective:     Chief Complaint / Reason for Physician Visit: F/U Acute on chronic Systolic CHF, HTN, ? HAZEL/CKD 3, elevated trop  \"I am feeling better\". Discussed with RN events overnight. Review of Systems:  Symptom Y/N Comments  Symptom Y/N Comments   Fever/Chills n   Chest Pain n    Poor Appetite n   Edema y    Cough n   Abdominal Pain n    Sputum n   Joint Pain     SOB/MARSHALL y   Pruritis/Rash     Nausea/vomit n   Tolerating PT/OT y    Diarrhea    Tolerating Diet y    Constipation    Other       Could NOT obtain due to:      Objective:     VITALS:   Last 24hrs VS reviewed since prior progress note.  Most recent are:  Patient Vitals for the past 24 hrs:   Temp Pulse Resp BP SpO2   09/27/22 0715 98 °F (36.7 °C) 88 28 (!) 147/94 91 %   09/27/22 0630 -- 88 26 (!) 175/125 93 %   09/27/22 0600 -- 96 29 (!) 172/124 97 %   09/27/22 0530 -- 88 17 (!) 157/109 97 %   09/27/22 0500 -- 83 20 (!) 164/111 95 %   09/27/22 0435 -- 78 24 (!) 166/117 94 %   09/27/22 0400 97.9 °F (36.6 °C) (!) 112 24 (!) 192/114 94 %   09/27/22 0337 -- (!) 108 30 (!) 209/138 96 %   09/27/22 0300 -- (!) 105 28 (!) 198/109 94 %   09/27/22 0245 -- 95 27 (!) 189/136 94 %   09/27/22 0230 -- 99 (!) 43 (!) 183/134 94 %   09/27/22 0215 -- 95 (!) 36 (!) 194/125 95 %   09/27/22 0200 -- 97 (!) 43 (!) 184/123 95 %   09/27/22 0145 -- 90 25 (!) 173/119 96 %   09/27/22 0130 -- 94 30 (!) 172/137 94 %   09/27/22 0115 -- 87 29 (!) 187/135 92 %   09/27/22 0100 -- 91 (!) 33 (!) 187/151 92 %   09/27/22 0045 -- 88 (!) 40 (!) 184/161 92 %   09/27/22 0030 -- 90 (!) 39 (!) 177/134 94 %   09/27/22 0015 -- 90 (!) 33 (!) 187/140 94 %   09/27/22 0000 -- 86 (!) 36 (!) 187/137 96 %   09/26/22 2215 -- 91 19 (!) 186/149 97 %   09/26/22 2200 -- 85 20 (!) 177/145 96 %   09/26/22 2145 -- 88 29 (!) 187/128 (!) 88 %   09/26/22 2131 -- 99 -- (!) 190/155 --   09/26/22 2130 -- (!) 104 (!) 38 (!) 190/155 (!) 87 %   09/26/22 2045 -- (!) 107 20 (!) 206/153 94 %   09/26/22 1910 98.2 °F (36.8 °C) (!) 105 20 (!) 197/142 94 %       Intake/Output Summary (Last 24 hours) at 9/27/2022 7518  Last data filed at 9/27/2022 0630  Gross per 24 hour   Intake 100 ml   Output 7780 ml   Net -7680 ml        I had a face to face encounter and independently examined this patient on 9/27/2022, as outlined below:  PHYSICAL EXAM:  General: WD, WN. Alert, cooperative, no acute distress  , obese +  EENT:  EOMI. Anicteric sclerae. MMM  Resp:  CTA bilaterally, no wheezing or rales. No accessory muscle use  CV:  Regular  rhythm,  B/L LE pitting edema +  GI:  Soft, Non distended, Non tender. +Bowel sounds  Neurologic:  Alert and oriented X 3, normal speech,   Psych:   Good insight. Not anxious nor agitated  Skin:  No rashes. No jaundice    Reviewed most current lab test results and cultures  YES  Reviewed most current radiology test results   YES  Review and summation of old records today    NO  Reviewed patient's current orders and MAR    YES  PMH/SH reviewed - no change compared to H&P  ________________________________________________________________________  Care Plan discussed with:    Comments   Patient x    Family      RN x    Care Manager x    Consultant                       x Multidiciplinary team rounds were held today with , nursing, pharmacist and clinical coordinator. Patient's plan of care was discussed; medications were reviewed and discharge planning was addressed. ________________________________________________________________________  Total NON critical care TIME:  36   Minutes    Total CRITICAL CARE TIME Spent:   Minutes non procedure based      Comments   >50% of visit spent in counseling and coordination of care x    ________________________________________________________________________  Zechariah Cardoza MD     Procedures: see electronic medical records for all procedures/Xrays and details which were not copied into this note but were reviewed prior to creation of Plan. LABS:  I reviewed today's most current labs and imaging studies.   Pertinent labs include:  Recent Labs     09/27/22  0556 09/26/22 1918   WBC 13.1* 14.7*   HGB 15.9 14.2   HCT 51.1* 45.5    348     Recent Labs     09/27/22  0533 09/27/22  0124 09/26/22 1918     --  143   K 3.9  --  4.5     --  107   CO2 33*  --  31   *  --  115*   BUN 20  --  23*   CREA 1.57*  --  1.65*   CA 8.6  --  8.4*   MG 2.1 1.8  --    ALB  --   --  2.6*   TBILI  --   --  0.6   ALT  --   --  24       Signed: Braden Villa MD

## 2022-09-28 ENCOUNTER — APPOINTMENT (OUTPATIENT)
Dept: NON INVASIVE DIAGNOSTICS | Age: 42
DRG: 280 | End: 2022-09-28
Attending: INTERNAL MEDICINE
Payer: COMMERCIAL

## 2022-09-28 LAB
ANION GAP SERPL CALC-SCNC: 5 MMOL/L (ref 5–15)
ATRIAL RATE: 108 BPM
BUN SERPL-MCNC: 21 MG/DL (ref 6–20)
BUN/CREAT SERPL: 15 (ref 12–20)
CALCIUM SERPL-MCNC: 8.8 MG/DL (ref 8.5–10.1)
CALCULATED P AXIS, ECG09: 55 DEGREES
CALCULATED R AXIS, ECG10: -45 DEGREES
CALCULATED T AXIS, ECG11: 84 DEGREES
CHLORIDE SERPL-SCNC: 101 MMOL/L (ref 97–108)
CO2 SERPL-SCNC: 34 MMOL/L (ref 21–32)
CREAT SERPL-MCNC: 1.42 MG/DL (ref 0.7–1.3)
DIAGNOSIS, 93000: NORMAL
ECHO AO ROOT DIAM: 3.1 CM
ECHO AO ROOT INDEX: 1.18 CM/M2
ECHO AV AREA PEAK VELOCITY: 5.4 CM2
ECHO AV AREA VTI: 5.1 CM2
ECHO AV AREA/BSA PEAK VELOCITY: 2.1 CM2/M2
ECHO AV AREA/BSA VTI: 1.9 CM2/M2
ECHO AV MEAN GRADIENT: 3 MMHG
ECHO AV MEAN VELOCITY: 0.8 M/S
ECHO AV PEAK GRADIENT: 5 MMHG
ECHO AV PEAK VELOCITY: 1.1 M/S
ECHO AV VELOCITY RATIO: 0.82
ECHO AV VTI: 20.2 CM
ECHO LA DIAMETER INDEX: 1.87 CM/M2
ECHO LA DIAMETER: 4.9 CM
ECHO LA TO AORTIC ROOT RATIO: 1.58
ECHO LA VOL 4C: 174 ML (ref 18–58)
ECHO LA VOLUME INDEX A4C: 66 ML/M2 (ref 16–34)
ECHO LV E' LATERAL VELOCITY: 6 CM/S
ECHO LV E' SEPTAL VELOCITY: 6 CM/S
ECHO LV EDV A4C: 276 ML
ECHO LV EDV INDEX A4C: 105 ML/M2
ECHO LV EJECTION FRACTION A4C: 31 %
ECHO LV ESV A4C: 192 ML
ECHO LV ESV INDEX A4C: 73 ML/M2
ECHO LV FRACTIONAL SHORTENING: 26 % (ref 28–44)
ECHO LV INTERNAL DIMENSION DIASTOLE INDEX: 2.94 CM/M2
ECHO LV INTERNAL DIMENSION DIASTOLIC: 7.7 CM (ref 4.2–5.9)
ECHO LV INTERNAL DIMENSION SYSTOLIC INDEX: 2.18 CM/M2
ECHO LV INTERNAL DIMENSION SYSTOLIC: 5.7 CM
ECHO LV IVSD: 1.3 CM (ref 0.6–1)
ECHO LV MASS 2D: 611.7 G (ref 88–224)
ECHO LV MASS INDEX 2D: 233.5 G/M2 (ref 49–115)
ECHO LV POSTERIOR WALL DIASTOLIC: 1.6 CM (ref 0.6–1)
ECHO LV RELATIVE WALL THICKNESS RATIO: 0.42
ECHO LVOT AREA: 6.6 CM2
ECHO LVOT AV VTI INDEX: 0.77
ECHO LVOT DIAM: 2.9 CM
ECHO LVOT MEAN GRADIENT: 2 MMHG
ECHO LVOT PEAK GRADIENT: 3 MMHG
ECHO LVOT PEAK VELOCITY: 0.9 M/S
ECHO LVOT STROKE VOLUME INDEX: 39.1 ML/M2
ECHO LVOT SV: 102.3 ML
ECHO LVOT VTI: 15.5 CM
ECHO MV A VELOCITY: 0.35 M/S
ECHO MV AREA VTI: 5.7 CM2
ECHO MV E DECELERATION TIME (DT): 214.1 MS
ECHO MV E VELOCITY: 0.69 M/S
ECHO MV E/A RATIO: 1.97
ECHO MV E/E' LATERAL: 11.5
ECHO MV E/E' RATIO (AVERAGED): 11.5
ECHO MV E/E' SEPTAL: 11.5
ECHO MV LVOT VTI INDEX: 1.15
ECHO MV MAX VELOCITY: 0.7 M/S
ECHO MV MEAN GRADIENT: 1 MMHG
ECHO MV MEAN VELOCITY: 0.5 M/S
ECHO MV PEAK GRADIENT: 2 MMHG
ECHO MV REGURGITANT PEAK GRADIENT: 74 MMHG
ECHO MV REGURGITANT PEAK VELOCITY: 4.3 M/S
ECHO MV VTI: 17.8 CM
ECHO PULMONARY ARTERY END DIASTOLIC PRESSURE: 10 MMHG
ECHO PV MAX VELOCITY: 0.8 M/S
ECHO PV PEAK GRADIENT: 3 MMHG
ECHO PV REGURGITANT MAX VELOCITY: 1.6 M/S
ECHO RV INTERNAL DIMENSION: 6 CM
ERYTHROCYTE [DISTWIDTH] IN BLOOD BY AUTOMATED COUNT: 14.6 % (ref 11.5–14.5)
GLUCOSE SERPL-MCNC: 95 MG/DL (ref 65–100)
HCT VFR BLD AUTO: 48.5 % (ref 36.6–50.3)
HGB BLD-MCNC: 15.2 G/DL (ref 12.1–17)
MCH RBC QN AUTO: 29.4 PG (ref 26–34)
MCHC RBC AUTO-ENTMCNC: 31.3 G/DL (ref 30–36.5)
MCV RBC AUTO: 93.8 FL (ref 80–99)
NRBC # BLD: 0 K/UL (ref 0–0.01)
NRBC BLD-RTO: 0 PER 100 WBC
P-R INTERVAL, ECG05: 148 MS
PHOSPHATE SERPL-MCNC: 4.7 MG/DL (ref 2.6–4.7)
PLATELET # BLD AUTO: 309 K/UL (ref 150–400)
PMV BLD AUTO: 9.7 FL (ref 8.9–12.9)
POTASSIUM SERPL-SCNC: 3.9 MMOL/L (ref 3.5–5.1)
Q-T INTERVAL, ECG07: 352 MS
QRS DURATION, ECG06: 94 MS
QTC CALCULATION (BEZET), ECG08: 471 MS
RBC # BLD AUTO: 5.17 M/UL (ref 4.1–5.7)
SODIUM SERPL-SCNC: 140 MMOL/L (ref 136–145)
VENTRICULAR RATE, ECG03: 108 BPM
WBC # BLD AUTO: 13.2 K/UL (ref 4.1–11.1)

## 2022-09-28 PROCEDURE — 74011000250 HC RX REV CODE- 250: Performed by: STUDENT IN AN ORGANIZED HEALTH CARE EDUCATION/TRAINING PROGRAM

## 2022-09-28 PROCEDURE — 93306 TTE W/DOPPLER COMPLETE: CPT

## 2022-09-28 PROCEDURE — 74011250636 HC RX REV CODE- 250/636: Performed by: STUDENT IN AN ORGANIZED HEALTH CARE EDUCATION/TRAINING PROGRAM

## 2022-09-28 PROCEDURE — 74011250637 HC RX REV CODE- 250/637: Performed by: INTERNAL MEDICINE

## 2022-09-28 PROCEDURE — 65270000046 HC RM TELEMETRY

## 2022-09-28 PROCEDURE — 74011250637 HC RX REV CODE- 250/637: Performed by: STUDENT IN AN ORGANIZED HEALTH CARE EDUCATION/TRAINING PROGRAM

## 2022-09-28 PROCEDURE — 84100 ASSAY OF PHOSPHORUS: CPT

## 2022-09-28 PROCEDURE — 85027 COMPLETE CBC AUTOMATED: CPT

## 2022-09-28 PROCEDURE — 80048 BASIC METABOLIC PNL TOTAL CA: CPT

## 2022-09-28 PROCEDURE — 36415 COLL VENOUS BLD VENIPUNCTURE: CPT

## 2022-09-28 RX ORDER — HYDRALAZINE HYDROCHLORIDE 20 MG/ML
20 INJECTION INTRAMUSCULAR; INTRAVENOUS
Status: DISCONTINUED | OUTPATIENT
Start: 2022-09-28 | End: 2022-09-30 | Stop reason: HOSPADM

## 2022-09-28 RX ORDER — AMLODIPINE BESYLATE 5 MG/1
5 TABLET ORAL DAILY
Status: DISCONTINUED | OUTPATIENT
Start: 2022-09-29 | End: 2022-09-29

## 2022-09-28 RX ADMIN — CARVEDILOL 25 MG: 12.5 TABLET, FILM COATED ORAL at 16:27

## 2022-09-28 RX ADMIN — SODIUM CHLORIDE, PRESERVATIVE FREE 10 ML: 5 INJECTION INTRAVENOUS at 14:20

## 2022-09-28 RX ADMIN — SODIUM CHLORIDE, PRESERVATIVE FREE 10 ML: 5 INJECTION INTRAVENOUS at 21:20

## 2022-09-28 RX ADMIN — ENOXAPARIN SODIUM 40 MG: 100 INJECTION SUBCUTANEOUS at 21:20

## 2022-09-28 RX ADMIN — ASPIRIN 81 MG: 81 TABLET, COATED ORAL at 08:41

## 2022-09-28 RX ADMIN — FUROSEMIDE 40 MG: 10 INJECTION, SOLUTION INTRAMUSCULAR; INTRAVENOUS at 14:18

## 2022-09-28 RX ADMIN — ROSUVASTATIN CALCIUM 20 MG: 20 TABLET, FILM COATED ORAL at 21:20

## 2022-09-28 RX ADMIN — FUROSEMIDE 40 MG: 10 INJECTION, SOLUTION INTRAMUSCULAR; INTRAVENOUS at 05:24

## 2022-09-28 RX ADMIN — SPIRONOLACTONE 25 MG: 25 TABLET ORAL at 08:42

## 2022-09-28 RX ADMIN — CARVEDILOL 25 MG: 12.5 TABLET, FILM COATED ORAL at 08:41

## 2022-09-28 RX ADMIN — ENOXAPARIN SODIUM 40 MG: 100 INJECTION SUBCUTANEOUS at 09:05

## 2022-09-28 RX ADMIN — SODIUM CHLORIDE, PRESERVATIVE FREE 10 ML: 5 INJECTION INTRAVENOUS at 05:28

## 2022-09-28 NOTE — PROGRESS NOTES
Pt is resting comfortably in chair without complaint. Patient is AxO x4. Vitals were taken and documented. Will continue to monitor and offer support as needed.

## 2022-09-28 NOTE — PROGRESS NOTES
Hospitalist Progress Note    NAME: Richelle Myles   :  1980   MRN:  [id number]       Assessment / Plan:  Acute on chronic Systolic CHF POA (EF 19-79% )  Elevated troponin-suspect type II MI POA- remains flat, no chest pain  Essential hypertension POA- uncontrolled    Can transfer off PCU to telemetry monitoring bed  IP Cardiology consulted- awaiting input, ?stress test   Check 2DEcho  -If EF remains less than 30 may need to consider ICD placement-defer to cardiology expertise  Cont IV Lasix 40mg BID  -Follow BMP and replete lites as needed  Strict I's and O's  Daily weights  Patient unsure of PTA spironolactone dose will started at 25 mg daily  Increase PTA Coreg to 25mg per recent Outside pharmacy records  As needed IV hydralazine & IV labetalol (added overnight)  Counseled on smoking including marijuana cessation  Continue PTA aspirin  Cont low dose statin started this admission  Troponin plateaued and patient denies any chest pain-we will continue to monitor for chest pain at some point to rule out ischemic cardiomyopathy superimposed on hypertensive heart disease     At risk for HAZEL POA- Cr improved to 1.5 today  Likely CKD stage 3-- baseline Cr ~1.3  Suspect prerenal secondary to congestive nephropathy-monitor renal function with diuresis     Leukocytosis, suspect reactive  Trend WBC- improved today      Former smoker  Daily marijuana use  Celebrated patient successful tobacco cessation. Educated on adverse health effects of marijuana use and recommended cessation, which patient reports he will pursue     40 or above Morbid obesity / Body mass index is 44.21 kg/m². Weight loss recommended, bariatric options recommended    Estimated discharge date: ? Barriers: cardiology clearance    Code status: Full  Prophylaxis: Lovenox  Recommended Disposition: Home w/Family     Subjective:     Chief Complaint / Reason for Physician Visit: F/U Acute on chronic Systolic CHF, HTN, ? HAZEL/CKD 3, elevated trop  \"I am feeling better\". Discussed with RN events overnight. Review of Systems:  Symptom Y/N Comments  Symptom Y/N Comments   Fever/Chills n   Chest Pain n    Poor Appetite n   Edema y    Cough n   Abdominal Pain n    Sputum n   Joint Pain     SOB/MARSHALL y   Pruritis/Rash     Nausea/vomit n   Tolerating PT/OT y    Diarrhea    Tolerating Diet y    Constipation    Other       Could NOT obtain due to:      Objective:     VITALS:   Last 24hrs VS reviewed since prior progress note. Most recent are:  Patient Vitals for the past 24 hrs:   Temp Pulse Resp BP SpO2   09/28/22 1627 -- 75 -- (!) 147/112 --   09/28/22 1518 97.9 °F (36.6 °C) 85 26 (!) 142/99 90 %   09/28/22 1418 -- 71 -- (!) 141/107 --   09/28/22 1108 97.5 °F (36.4 °C) 64 24 (!) 132/92 91 %   09/28/22 0952 -- -- -- 133/88 --   09/28/22 0841 -- 80 -- 133/88 --   09/28/22 0740 97.8 °F (36.6 °C) 82 22 (!) 150/100 93 %   09/28/22 0710 97.8 °F (36.6 °C) 81 18 (!) 169/107 95 %   09/28/22 0524 98.2 °F (36.8 °C) 71 20 (!) 143/99 94 %   09/28/22 0000 98.1 °F (36.7 °C) 69 22 (!) 131/98 91 %   09/27/22 2200 -- 68 25 (!) 147/103 92 %   09/27/22 2118 -- 63 -- (!) 149/101 --   09/27/22 2000 -- 73 22 (!) 147/106 97 %   09/27/22 1915 98 °F (36.7 °C) 77 21 (!) 157/112 93 %         Intake/Output Summary (Last 24 hours) at 9/28/2022 1911  Last data filed at 9/28/2022 1518  Gross per 24 hour   Intake 240 ml   Output 1795 ml   Net -1555 ml          I had a face to face encounter and independently examined this patient on 9/28/2022, as outlined below:  PHYSICAL EXAM:  General: WD, WN. Alert, cooperative, no acute distress  , obese +  EENT:  EOMI. Anicteric sclerae. MMM  Resp:  CTA bilaterally, no wheezing or rales. No accessory muscle use  CV:  Regular  rhythm,  B/L LE pitting edema +  GI:  Soft, Non distended, Non tender. +Bowel sounds  Neurologic:  Alert and oriented X 3, normal speech,   Psych:   Good insight. Not anxious nor agitated  Skin:  No rashes.   No jaundice    Reviewed most current lab test results and cultures  YES  Reviewed most current radiology test results   YES  Review and summation of old records today    NO  Reviewed patient's current orders and MAR    YES  PMH/SH reviewed - no change compared to H&P  ________________________________________________________________________  Care Plan discussed with:    Comments   Patient x    Family      RN x    Care Manager x    Consultant                       x Multidiciplinary team rounds were held today with , nursing, pharmacist and clinical coordinator. Patient's plan of care was discussed; medications were reviewed and discharge planning was addressed. ________________________________________________________________________  Total NON critical care TIME:  36   Minutes    Total CRITICAL CARE TIME Spent:   Minutes non procedure based      Comments   >50% of visit spent in counseling and coordination of care x    ________________________________________________________________________  Keenan Walsh MD     Procedures: see electronic medical records for all procedures/Xrays and details which were not copied into this note but were reviewed prior to creation of Plan. LABS:  I reviewed today's most current labs and imaging studies.   Pertinent labs include:  Recent Labs     09/28/22 0529 09/27/22 0533 09/26/22 1918   WBC 13.2* 13.1* 14.7*   HGB 15.2 15.9 14.2   HCT 48.5 51.1* 45.5    353 348       Recent Labs     09/28/22  0523 09/27/22 0533 09/27/22  0124 09/26/22 1918    139  --  143   K 3.9 3.9  --  4.5    103  --  107   CO2 34* 33*  --  31   GLU 95 108*  --  115*   BUN 21* 20  --  23*   CREA 1.42* 1.57*  --  1.65*   CA 8.8 8.6  --  8.4*   MG  --  2.1 1.8  --    PHOS 4.7  --   --   --    ALB  --   --   --  2.6*   TBILI  --   --   --  0.6   ALT  --   --   --  24         Signed: Keenan Walsh MD

## 2022-09-28 NOTE — PROGRESS NOTES
Cardiology Progress Note  9/28/2022     Admit Date: 9/26/2022  Admit Diagnosis: Acute HFrEF (heart failure with reduced ejection fraction) ([facility]) [I50.21]  CC: none currently  Cardiologist:  Dr Nettie Miranda, last seen in 2020; didn't get F/U echo nor OV rec then. Cardiac Assessment/Plan:    1) CM/CHF: acute sys/diast CHF admit 2/2019; Echo 2/5/19: Ef 20-25%, Mild MR; Mild-mod TR; PAp 35;  Likely related to HTN; Nl TSH/ferritin; Neg HIV; wt from 315 to 296;      2) Elevated troponin 2/2019: likely related to CM; Cath if EF not improved with med Rx; if improves, outpt MPI; no angina. 3) ECG c/w LVH/repolarization changes. 4) HTN  5) Cr 1.3 2/2019 but GFR >60.  6) ?ELE: + overnight pulse Ox;  formal outpt SS rec. 7) Lipids:  2/2019  8) Obesity: 296# 2019; 290# 2020.  9) tobacco use, quit 2/2019:  restarted 2020; quit 2022. Admitted w/ CHF. Echo pending. Current cardiac meds: asa; coreg 12.5 bid; lasix 40 IV bid; crestor 10; aldactone 25. Rec:  Cont diuresis; add ARB when back to PO lasix. NTG paste PRN for BP. TSH per primary team. Still needs outpt SS.      9/28: No CP/less dyspnea. BPs 130-160; HR 70-80s; Sinus; 93% 2L; ?I/Os; 354# to 326#. Hg 15.2; WBC 13. Cr 1.42 from 1.57;     Cardiac meds: asa; coreg 25 bid; lasix 40 IV bid; crestor 10; aldactone 25. No new cardiac recs. IV duretics today; wean O2; check sats with ambulation. Echo still pending.  ____________________________________________________________________  Luther Hunt is a 37 y.o. male presents with Acute HFrEF (heart failure with reduced ejection fraction) ([facility] [address] [I50.21]. As noted in H&P: \"37 y.o.    male with pertinent past medical history of essential hypertension, former tobacco use (quit 3 months prior), daily marijuana use, and HFrEF (EF 20-25% in 2019) lost to cardiology follow-up who presents with complaints of bilateral lower extremity swelling for the past 2-3 weeks now involving abdominal swelling and mild shortness of breath with orthopnea. Patient reports no memory of CHF diagnosis and states he was going through a bad divorce at that time and did not follow-up with cardiology as he was directed. He has recently been switched off of his Lasix which was replaced with spironolactone. He denies any other associated symptoms and ROS otherwise negative. He has been referred for sleep study, but has not scheduled the appointment. In the ED, patient afebrile, borderline tachycardic with heart rates around 100 bpm hypertensive 190s/140s saturating mid 90s on 2 L/min by nasal cannula. ECG demonstrates sinus tachycardia with evidence of biatrial enlargement and left axis deviation without definitive ischemic changes (lateral ST changes noted). CXR demonstrates marked cardiomegaly. Labs demonstrate: Stable troponin 320-> 317, sodium 143, potassium 4.5, BUN 23, creatinine 1.65 (baseline 1.1-1.3), proBNP 7237 (similar to prior admission for CHF), WBC 14.7, hemoglobin 14.2, platelets 302. \"     ______________________________________________________________________     The patient reports no chest pain/pressure; Worse LE edema then MARSHALL/orthpnea for last few weeks. Some sodium in diet; Occ forgets to take meds (few doses/week). No PND, orthopnea, palpitations, pre-syncope, syncope, peripheral edema, or decrease in exercise tolerance. No current complaints. ECG: sinus tachy; LAD; LVH w/ repol changes;  Tele: sinus  CXR: \"The lungs are clear. Marked cardiomegaly. \"     Notable labs: WBC 13; Hg 16; Cr 1.57 (gfr 60) from 1.65; LDL 77; trop 300s; proBNP 7k; TSH 4.3;   ______________________________________________________  Notable prior cardiac history:  @ OV 7/17/20:   1) CM/CHF: acute sys/diast CHF admit 2/2019; Echo 2/5/19: Ef 20-25%, Mild MR; Mild-mod TR; PAp 35;  Likely related to HTN; Nl TSH/ferritin; Neg HIV; wt from 315 to 296;      2) Elevated troponin 2/2019: likely related to CM; Cath if EF not improved with med Rx; if improves, outpt MPI; no angina. 3) ECG c/w LVH/repolarization changes. 4) HTN  5) Cr 1.3 2/2019 but GFR >60.  6) ?ELE: + overnight pulse Ox;  formal outpt SS rec. 7) Lipids:  2/2019  8) Obesity: 296# 2019; 290# 2020.  9) tobacco use, quit 2/2019:  restarted 2020. 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 Occ added salt; Rare EtOH.     2/2019:  Since being discharged, he stop smoking; he has had no chest pain. His MARSHALL has resolved. He still needs to schedule a sleep study but is planning to do so. He is back at work. 7/2020:  Since his last visit, he did not have the recommended blood work, echocardiogram, nor sleep study. He has started smoking again. His Lasix ran out; he is taking Coreg 12.5 bid but occasionally runs out. He took his BP meds today. Finances have been an issue. No chest pain, orthopnea or LE edema. No MARSHALL except in humidity. IMPRESSION AND PLAN  01. Other cardiomyopathies (I42.8):  Probable hypertensive CM; again needs follow-up echo; if not improved, CTA vs cath. If improves or normalizes, MPI. May need ICD. We discussed the signs and symptoms of unstable angina, myocardial infarction and malignant arrhythmia. The patient knows to seek immediate medical attention should they occur. ECG done 2-D w/CFD Echo to be done. first available   02. Chronic combined systolic (congestive) and diastolic (congestive) heart failure (I50.42):  He appears to be euvolemic. He needs the follow-up labs that were previously ordered. 03. Hypertensive heart disease with heart failure (I11.0): Inadequately controlled. Coreg increased to 25 bid & lisinopril to 40 qday; Add aldactone 25 qday; NP visit in 2 weeks; if BP not normalized & Cr ok, lisinopril to 40 bid. Follow-up labs needed if ACEi or diuretics changed. See Order Plan Return for follow-up with Nurse Practitioner in two weeks. .  The patient was instructed on a low sodium diet.   04. Sleep apnea, unspecified (G47.30):  Still needs a formal sleep study & likely treatment. NPSG (Diagnostic Overnight Sleep Study) to be done   05. Personal hx of nicotine dependence (Z87.031): He was previously abstinent, but has resumed tobacco use. The patient was instructed on tobacco cessation. 06. Body mass index (BMI) 38.0-38.9, adult (C38.66): Improved since last seen. The patient was instructed on AHA diet and regular exercise. ORDERS:  1 ECG done   2 Dietary management education, guidance, and counseling   3 Return for follow-up with Nurse Practitioner in two weeks. 4 NPSG (Diagnostic Overnight Sleep Study). 5 2-D w/ CFD Echocardiogram   6 Have a CMP (Comprehensive Metabolic Panel) drawn at the first available time. 7 Return office visit with Magalis Fofana MD in 3 Months. 8 The patient was instructed on AHA diet and regular exercise. 9 Patient counseled on the following: Low Salt Diet. 10 Patient counseled on the following: Tobacco Cessation. 11 Tobacco cessation counseling         7/17/20 MEDICATION LIST  Medication Sig Desc   Aspir-81 81 mg tablet,delayed release take 1 tablet by oral route  every day   carvedilol 25 mg tablet take 1 tablet by oral route 2 times every day with food   lisinopril 40 mg tablet take 1 tablet by oral route  every day   spironolactone 25 mg tablet take 1 Tablet by oral route  every day         For other plans, see orders.   Hospital problem list     Active Hospital Problems    Diagnosis Date Noted    Acute H[facility] (heart failure with reduced [facility] fraction[facility] 09/26/2022        Subjective: Barbara Deshpande reports       Chest pain X none  consistent with:  Non-cardiac CP         Atypical CP     None now  On going  Anginal CP     Dyspnea  none  at rest  with exertion        x improved  unchanged  worse              PND X none  overnight       Orthopnea X none  improved  unchanged  worse   Presyncope X none  improved  unchanged  worse Ambulated in hallway without symptoms   Yes   Ambulated in room without symptoms  Yes   Objective:     Physical Exam:  Overall VSSAF;  Visit Vitals  /88   Pulse 80   Temp 97.8 °F (36.6 °C)   Resp 22   Ht 6' (1.829 m)   Wt 148.1 kg (326 lb 8 oz)   SpO2 93%   BMI 44.28 kg/m²     Temp (24hrs), Av °F (36.7 °C), Min:97.7 °F (36.5 °C), Max:98.3 °F (36.8 °C)    Patient Vitals for the past 8 hrs:   Pulse   22 0841 80   22 0740 82   22 0710 81   22 0524 71     Patient Vitals for the past 8 hrs:   Resp   22 0740 22   22 0710 18   22 0524 20     Patient Vitals for the past 8 hrs:   BP   22 0841 133/88   22 0740 (!) 150/100   22 0710 (!) 169/107   22 0524 (!) 143/99       Intake/Output Summary (Last 24 hours) at 2022 0849  Last data filed at 2022 0847  Gross per 24 hour   Intake --   Output 1020 ml   Net -1020 ml     General Appearance: Well developed, well nourished, no acute distress. Ears/Nose/Mouth/Throat:   Normal MM; anicteric. JVP: WNL   Resp:   Lungs clear to auscultation bilaterally. Nl resp effort. Cardiovascular:  RRR, S1, S2 normal, no new murmur. No gallop or rub. Abdomen:   Soft, non-tender, bowel sounds are present. Extremities: Mild edema bilaterally. Skin:  Neuro: Warm and dry. A/O x3, grossly nonfocal       cath site intact w/o hematoma or new bruit; distal pulse unchanged  Yes   Data Review:     Telemetry independently reviewed x sinus  chronic afib  parox afib  NSVT     ECG independently reviewed  NSR  afib  no significant changes  NSST-Tw chgs     x no new ECG provided for review   Lab results reviewed as noted below. Current medications reviewed as noted below. No results for input(s): PH, PCO2, PO2 in the last 72 hours. No results for input(s): CPK, CKMB, CKNDX, TROIQ in the last 72 hours.   Recent Labs     22  0529 22  0523 22  0533 22   NA  --  140 139 143   K  -- 3.9 3.9 4.5   CL  --  101 103 107   CO2  --  34* 33* 31   BUN  --  21* 20 23*   CREA  --  1.42* 1.57* 1.65*   GFRAA  --  >60 60* 56*   GLU  --  95 108* 115*   PHOS  --  4.7  --   --    CA  --  8.8 8.6 8.4*   ALB  --   --   --  2.6*   WBC 13.2*  --  13.1* 14.7*   HGB 15.2  --  15.9 14.2   HCT 48.5  --  51.1* 45.5     --  353 348     Lab Results   Component Value Date/Time    Cholesterol, total 126 09/27/2022 01:24 AM    HDL Cholesterol 26 09/27/2022 01:24 AM    LDL, calculated 77.2 09/27/2022 01:24 AM    Triglyceride 114 09/27/2022 01:24 AM    CHOL/HDL Ratio 4.8 09/27/2022 01:24 AM     Recent Labs     09/26/22 1918   AP 81   TP 6.9   ALB 2.6*   GLOB 4.3*     No results for input(s): INR, PTP, APTT, INREXT in the last 72 hours.    No components found for: [place]    Current Facility-Administered Medications   Medication Dose Route Frequency    cloNIDine HCL (CATAPRES) tablet 0.1 mg  0.1 mg Oral Q2H PRN    labetaloL (NORMODYNE;TRANDATE) injection 10 mg  10 mg IntraVENous Q4H PRN    hydrALAZINE (APRESOLINE) 20 mg/mL injection 10 mg  10 mg IntraVENous Q4H PRN    nitroglycerin (NITROBID) 2 % ointment 1 Inch  1 Inch Topical Q6H PRN    carvediloL (COREG) tablet 25 mg  25 mg Oral BID WITH MEALS    aspirin delayed-release tablet 81 mg  81 mg Oral DAILY    spironolactone (ALDACTONE) tablet 25 mg  25 mg Oral DAILY    furosemide (LASIX) injection 40 mg  40 mg IntraVENous Q12H    rosuvastatin (CRESTOR) tablet 20 mg  20 mg Oral QHS    sodium chloride (NS) flush 5-40 mL  5-40 mL IntraVENous Q8H    sodium chloride (NS) flush 5-40 mL  5-40 mL IntraVENous PRN    acetaminophen (TYLENOL) tablet 650 mg  650 mg Oral Q6H PRN    Or    acetaminophen (TYLENOL) suppository 650 mg  650 mg Rectal Q6H PRN    polyethylene glycol (MIRALAX) packet 17 g  17 g Oral DAILY PRN    ondansetron (ZOFRAN ODT) tablet 4 mg  4 mg Oral Q8H PRN    Or    ondansetron (ZOFRAN) injection 4 mg  4 mg IntraVENous Q6H PRN    enoxaparin (LOVENOX) injection 40 mg  40 mg SubCUTAneous Q12H        Abdullahi Andujar MD

## 2022-09-28 NOTE — PROGRESS NOTES
Problem: Falls - Risk of  Goal: *Absence of Falls  Description: Document Josie Nap Fall Risk and appropriate interventions in the flowsheet.   Outcome: Progressing Towards Goal  Note: Fall Risk Interventions:  Mobility Interventions: Patient to call before getting OOB         Medication Interventions: Patient to call before getting OOB, Teach patient to arise slowly    Elimination Interventions: Call light in reach, Toileting schedule/hourly rounds              Problem: Patient Education: Go to Patient Education Activity  Goal: Patient/Family Education  Outcome: Progressing Towards Goal

## 2022-09-28 NOTE — PROGRESS NOTES
0700: Bedside shift change report given to [id number] and Ivory RN (oncoming nurse) by Dariusz Mckeon RN (offgoing nurse). Report included the following information SBAR, Kardex, Intake/Output, and MAR. Pt resting comfortably with no s/x of distress or complaints. 0935: pt off the floor for echo.     1100: pt back on floor from echo. VS stable and resting comfortably with no s/x of pain. 1900: End of Shift Note    Bedside shift change report given to El Valle (oncoming nurse) by Tj Hayward, Jessica Hernandez (offgoing nurse). Report included the following information SBAR, Kardex, Intake/Output, and MAR    Shift worked:  7a-7p     Shift summary and any significant changes:     Pt had echo today. Attempted to wean O2 but unsuccessful. Pt on 2L. D/c Friday? ? Concerns for physician to address:  N/a     Zone phone for oncoming shift:          Activity:  Activity Level: Up with Assistance  Number times ambulated in hallways past shift: 0  Number of times OOB to chair past shift: 3    Cardiac:   Cardiac Monitoring: Yes      Cardiac Rhythm: Sinus Rhythm    Access:  Current line(s): PIV     Genitourinary:   Urinary status: voiding    Respiratory:   O2 Device: Nasal cannula  Chronic home O2 use?: NO  Incentive spirometer at bedside: YES       GI:  Last Bowel Movement Date:  (PTA)  Current diet:  ADULT DIET Regular; Low Fat/Low Chol/High Fiber/2 gm Na  Passing flatus: YES  Tolerating current diet: YES       Pain Management:   Patient states pain is manageable on current regimen: YES    Skin:  Karthikeyan Score: 21  Interventions: increase time out of bed    Patient Safety:  Fall Score:  Total Score: 2  Interventions: pt to call before getting OOB  High Fall Risk: Yes    Length of Stay:  Expected LOS: 3d 19h  Actual LOS: 2      Tj Hayward, RN

## 2022-09-28 NOTE — PROGRESS NOTES
1900: Bedside shift change report given to El Valle (oncoming nurse) by Janeen Morris RN (offgoing nurse). Report included the following information SBAR, Kardex, ED Summary, Intake/Output, MAR, Recent Results, and Cardiac Rhythm NSR . End of Shift Note    Bedside shift change report given to Gale Alex (oncoming nurse) by Remedios Guardado RN (offgoing nurse). Report included the following information SBAR, Kardex, ED Summary, Intake/Output, MAR, Recent Results, and Cardiac Rhythm NSR    Shift worked:  [id number]     Shift summary and any significant changes:     Patient diastolic hypertensive, labetolol given. Concerns for physician to address:  Echo, stabilizing BP      Zone phone for oncoming shift:          Activity:  Activity Level: Up with Assistance  Number times ambulated in hallways past shift: 0  Number of times OOB to chair past shift: 1    Cardiac:   Cardiac Monitoring: Yes      Cardiac Rhythm: Sinus Rhythm    Access:  Current line(s): PIV     Genitourinary:   Urinary status: voiding    Respiratory:   O2 Device: Nasal cannula  Chronic home O2 use?: YES  Incentive spirometer at bedside: NO       GI:  Last Bowel Movement Date:  (PTA)  Current diet:  ADULT DIET Regular; Low Fat/Low Chol/High Fiber/2 gm Na  Passing flatus: YES  Tolerating current diet: YES       Pain Management:   Patient states pain is manageable on current regimen: YES    Skin:  Karthikeyan Score: 21  Interventions: increase time out of bed    Patient Safety:  Fall Score:  Total Score: 2  Interventions: gripper socks, pt to call before getting OOB, and stay with me (per policy)  High Fall Risk: Yes    Length of Stay:  Expected LOS: 3d 19h  Actual LOS: [facility], RN

## 2022-09-29 LAB
ANION GAP SERPL CALC-SCNC: 3 MMOL/L (ref 5–15)
BNP SERPL-MCNC: 1327 PG/ML
BUN SERPL-MCNC: 24 MG/DL (ref 6–20)
BUN/CREAT SERPL: 17 (ref 12–20)
CALCIUM SERPL-MCNC: 9.1 MG/DL (ref 8.5–10.1)
CHLORIDE SERPL-SCNC: 101 MMOL/L (ref 97–108)
CO2 SERPL-SCNC: 37 MMOL/L (ref 21–32)
CREAT SERPL-MCNC: 1.43 MG/DL (ref 0.7–1.3)
GLUCOSE SERPL-MCNC: 94 MG/DL (ref 65–100)
POTASSIUM SERPL-SCNC: 4.3 MMOL/L (ref 3.5–5.1)
SODIUM SERPL-SCNC: 141 MMOL/L (ref 136–145)
T4 FREE SERPL-MCNC: 1.2 NG/DL (ref 0.8–1.5)

## 2022-09-29 PROCEDURE — 77010033678 HC OXYGEN DAILY

## 2022-09-29 PROCEDURE — 74011250637 HC RX REV CODE- 250/637: Performed by: INTERNAL MEDICINE

## 2022-09-29 PROCEDURE — 65270000046 HC RM TELEMETRY

## 2022-09-29 PROCEDURE — 74011250637 HC RX REV CODE- 250/637: Performed by: STUDENT IN AN ORGANIZED HEALTH CARE EDUCATION/TRAINING PROGRAM

## 2022-09-29 PROCEDURE — 80048 BASIC METABOLIC PNL TOTAL CA: CPT

## 2022-09-29 PROCEDURE — 74011000250 HC RX REV CODE- 250: Performed by: STUDENT IN AN ORGANIZED HEALTH CARE EDUCATION/TRAINING PROGRAM

## 2022-09-29 PROCEDURE — 84439 ASSAY OF FREE THYROXINE: CPT

## 2022-09-29 PROCEDURE — 84480 ASSAY TRIIODOTHYRONINE (T3): CPT

## 2022-09-29 PROCEDURE — 83880 ASSAY OF NATRIURETIC PEPTIDE: CPT

## 2022-09-29 PROCEDURE — 74011000250 HC RX REV CODE- 250: Performed by: INTERNAL MEDICINE

## 2022-09-29 PROCEDURE — 36415 COLL VENOUS BLD VENIPUNCTURE: CPT

## 2022-09-29 PROCEDURE — 74011250636 HC RX REV CODE- 250/636: Performed by: STUDENT IN AN ORGANIZED HEALTH CARE EDUCATION/TRAINING PROGRAM

## 2022-09-29 RX ORDER — BUMETANIDE 1 MG/1
1 TABLET ORAL DAILY
Status: DISCONTINUED | OUTPATIENT
Start: 2022-09-30 | End: 2022-09-30 | Stop reason: HOSPADM

## 2022-09-29 RX ORDER — BUMETANIDE 0.25 MG/ML
2 INJECTION INTRAMUSCULAR; INTRAVENOUS ONCE
Status: COMPLETED | OUTPATIENT
Start: 2022-09-29 | End: 2022-09-29

## 2022-09-29 RX ADMIN — SODIUM CHLORIDE, PRESERVATIVE FREE 10 ML: 5 INJECTION INTRAVENOUS at 14:42

## 2022-09-29 RX ADMIN — ENOXAPARIN SODIUM 40 MG: 100 INJECTION SUBCUTANEOUS at 21:50

## 2022-09-29 RX ADMIN — SODIUM CHLORIDE, PRESERVATIVE FREE 10 ML: 5 INJECTION INTRAVENOUS at 21:51

## 2022-09-29 RX ADMIN — SACUBITRIL AND VALSARTAN 1 TABLET: 24; 26 TABLET, FILM COATED ORAL at 21:50

## 2022-09-29 RX ADMIN — CARVEDILOL 25 MG: 12.5 TABLET, FILM COATED ORAL at 16:30

## 2022-09-29 RX ADMIN — ASPIRIN 81 MG: 81 TABLET, COATED ORAL at 09:52

## 2022-09-29 RX ADMIN — SODIUM CHLORIDE, PRESERVATIVE FREE 10 ML: 5 INJECTION INTRAVENOUS at 05:56

## 2022-09-29 RX ADMIN — SPIRONOLACTONE 25 MG: 25 TABLET ORAL at 09:53

## 2022-09-29 RX ADMIN — CARVEDILOL 25 MG: 12.5 TABLET, FILM COATED ORAL at 09:50

## 2022-09-29 RX ADMIN — ROSUVASTATIN CALCIUM 20 MG: 20 TABLET, FILM COATED ORAL at 21:50

## 2022-09-29 RX ADMIN — SACUBITRIL AND VALSARTAN 1 TABLET: 24; 26 TABLET, FILM COATED ORAL at 10:03

## 2022-09-29 RX ADMIN — ENOXAPARIN SODIUM 40 MG: 100 INJECTION SUBCUTANEOUS at 09:53

## 2022-09-29 RX ADMIN — FUROSEMIDE 40 MG: 10 INJECTION, SOLUTION INTRAMUSCULAR; INTRAVENOUS at 05:56

## 2022-09-29 RX ADMIN — BUMETANIDE 2 MG: 0.25 INJECTION INTRAMUSCULAR; INTRAVENOUS at 18:36

## 2022-09-29 NOTE — PROGRESS NOTES
1900: Bedside shift change report given to El Valle (oncoming nurse) by Luis Curtis (offgoing nurse). Report included the following information SBAR, Kardex, ED Summary, Intake/Output, MAR, Recent Results, and Cardiac Rhythm NSR . End of Shift Note    Bedside shift change report given to Maria Del Carmen ELI (oncoming nurse) by Heidi Negrete RN (offgoing nurse). Report included the following information SBAR, Kardex, ED Summary, Intake/Output, MAR, Recent Results, and Cardiac Rhythm NSR    Shift worked:  [id number]     Shift summary and any significant changes:     No changes overnight     Concerns for physician to address: None at this mable e     Zone phone for oncoming shift:          Activity:  Activity Level: Up with Assistance  Number times ambulated in hallways past shift: 0  Number of times OOB to chair past shift: 0    Cardiac:   Cardiac Monitoring: Yes      Cardiac Rhythm: Sinus Rhythm    Access:  Current line(s): PIV     Genitourinary:   Urinary status: voiding    Respiratory:   O2 Device: Nasal cannula, Humidifier  Chronic home O2 use?: NO  Incentive spirometer at bedside: NO       GI:  Last Bowel Movement Date:  (PTA)  Current diet:  ADULT DIET Regular; Low Fat/Low Chol/High Fiber/2 gm Na  Passing flatus: YES  Tolerating current diet: YES       Pain Management:   Patient states pain is manageable on current regimen: YES    Skin:  Karthikeyan Score: 21  Interventions: float heels and increase time out of bed    Patient Safety:  Fall Score:  Total Score: 2  Interventions: gripper socks and pt to call before getting OOB  High Fall Risk: Yes    Length of Stay:  Expected LOS: 3d 19h  Actual LOS: [address] Llanos RN

## 2022-09-29 NOTE — PROGRESS NOTES
0700: Bedside shift change report given to [address] (oncoming nurse) by Analia Doyle RN (offgoing nurse). Report included the following information SBAR, Kardex, Intake/Output, and MAR. Pt resting comfortably with no s/x of distress. 1535: Pulse oximetry assessment   96% at rest on room air (if 88% or less, skip next steps)  95% while ambulating on room air  --> Patient NOT requiring any O2 at this time.    N/A% at rest on 0 LPM  N/A% while ambulating on 0 LPM

## 2022-09-29 NOTE — PROGRESS NOTES
Hospitalist Progress Note    NAME: Richelle Myles   :  1980   MRN:  [id number]       Assessment / Plan:    Acute on chronic Systolic CHF POA LVEF 25 to 30%  Acute respiratory failure with hypoxia POA RA sat 87%, dyspnea, RR to 38  Elevated troponin 320 --> 317 POA suspect type II MI POA  Essential hypertension POA  Admitted with SOB and hypoxia and edema  pBNP 7,237  pCXR with marked cardiomegaly  Echo Reduced left ventricular systolic function with a visually estimated EF of 25 - 30%. Required O2 2 liters after admit  IV diuresis, change to PO in AM, additional IV bumex tonight  Strict I's and O's  Daily weights  BP uncontrolled, lisinopril held in anticipation of starting entresto   Temporarily on norvasc   Starting entresto today  Continue coreg  As needed IV hydralazine  Counseled on smoking including marijuana cessation  Continue PTA aspirin, statin added  Cardiology holding off on ischemic work-up till outpatient setting    CKD stage 2 POA  Creatinine 1.4 to 1.5 at baseline  Watch with diuresis     Leukocytosis, suspect reactive  Stable ~ 13,000  No fevers  Check procalcitonin     Former smoker  Daily marijuana use  Celebrated patient successful tobacco cessation. Educated on adverse health effects of marijuana use and recommended cessation     Morbid obesity POA Body mass index is 44.16 kg/m². Code status: Full  Prophylaxis: Lovenox  Recommended Disposition: Home w/Family    Subjective:     Chief Complaint / Reason for Physician Visit: F/U Acute on chronic Systolic CHF, HTN, ? HAZEL/CKD 3, elevated trop  \"I am feeling better\". Discussed with RN events overnight.    O2 just weaned off earlier today  Overall feels better  Some NSVT, cardiology wants life vest  No CP    Review of Systems:  Symptom Y/N Comments  Symptom Y/N Comments   Fever/Chills n   Chest Pain n    Poor Appetite n   Edema y    Cough n   Abdominal Pain n    Sputum n   Joint Pain     SOB/MARSHALL y   Pruritis/Rash     Nausea/vomit n Tolerating PT/OT y    Diarrhea    Tolerating Diet y    Constipation    Other       Could NOT obtain due to:      Objective:     VITALS:   Last 24hrs VS reviewed since prior progress note. Most recent are:  Patient Vitals for the past 24 hrs:   Temp Pulse Resp BP SpO2   09/29/22 1447 97.8 °F (36.6 °C) 75 14 (!) 131/91 96 %   09/29/22 1100 97.6 °F (36.4 °C) 77 16 (!) 135/90 100 %   09/29/22 0950 -- 75 -- (!) 148/103 --   09/29/22 0826 97.5 °F (36.4 °C) 73 15 (!) 146/111 99 %   09/29/22 0724 97.3 °F (36.3 °C) 75 19 (!) 149/109 93 %   09/29/22 0556 97.8 °F (36.6 °C) 73 18 (!) 145/107 97 %   09/29/22 0000 98 °F (36.7 °C) 68 20 (!) 138/96 92 %   09/28/22 2000 -- 75 11 (!) 134/96 92 %   09/28/22 1938 98.3 °F (36.8 °C) 77 15 (!) 135/93 92 %         Intake/Output Summary (Last 24 hours) at 9/29/2022 1828  Last data filed at 9/29/2022 1630  Gross per 24 hour   Intake 720 ml   Output 3750 ml   Net -3030 ml          I had a face to face encounter and independently examined this patient on 9/29/2022, as outlined below:  PHYSICAL EXAM:  General: WD, WN. Alert, cooperative, no acute distress   EENT:  Anicteric sclerae. MMM  Resp:  CTA bilaterally, no wheezing or rales. No accessory muscle use  CV:  Regular  rhythm,  B/L LE pitting edema +  GI:  Soft, Non distended, Non tender. +Bowel sounds  Neurologic:  Alert and oriented X 3, normal speech,   Psych:   Good insight. Not anxious nor agitated  Skin:  No rashes.   No jaundice    Reviewed most current lab test results and cultures  YES  Reviewed most current radiology test results   YES  Review and summation of old records today    NO  Reviewed patient's current orders and MAR    YES  PMH/SH reviewed - no change compared to H&P  ________________________________________________________________________  Care Plan discussed with:    Comments   Patient x    Family      RN x    Care Manager x    Consultant                       x Multidiciplinary team rounds were held today with case manager, nursing, pharmacist and clinical coordinator. Patient's plan of care was discussed; medications were reviewed and discharge planning was addressed. ________________________________________________________________________      Comments   >50% of visit spent in counseling and coordination of care x    ________________________________________________________________________  Raya Pineda MD     Procedures: see electronic medical records for all procedures/Xrays and details which were not copied into this note but were reviewed prior to creation of Plan. LABS:  I reviewed today's most current labs and imaging studies.   Pertinent labs include:  Recent Labs     09/28/22  0529 09/27/22  0533 09/26/22 1918   WBC 13.2* 13.1* 14.7*   HGB 15.2 15.9 14.2   HCT 48.5 51.1* 45.5    353 348       Recent Labs     09/29/22  0555 09/28/22  0523 09/27/22  0533 09/27/22  0124 09/26/22 1918    140 139  --  143   K 4.3 3.9 3.9  --  4.5    101 103  --  107   CO2 37* 34* 33*  --  31   GLU 94 95 108*  --  115*   BUN 24* 21* 20  --  23*   CREA 1.43* 1.42* 1.57*  --  1.65*   CA 9.1 8.8 8.6  --  8.4*   MG  --   --  2.1 1.8  --    PHOS  --  4.7  --   --   --    ALB  --   --   --   --  2.6*   TBILI  --   --   --   --  0.6   ALT  --   --   --   --  24         Signed: Raya Pineda MD

## 2022-09-29 NOTE — PROGRESS NOTES
Cardiology Progress Note  9/29/2022     Admit Date: 9/26/2022  Admit Diagnosis: Acute HFrEF (heart failure with reduced ejection fraction) ([facility]) [I50.21]  CC: none currently  Cardiologist:  Dr Annemarie León, last seen in 2020; didn't get F/U echo nor OV rec then. Cardiac Assessment/Plan:    1) CM/CHF: acute sys/diast CHF admit 2/2019; Echo 2/5/19: Ef 20-25%, Mild MR; Mild-mod TR; PAp 35;  Likely related to HTN; Nl TSH/ferritin; Neg HIV; wt from 315 to 296;      2) Elevated troponin 2/2019: likely related to CM; Cath if EF not improved with med Rx; if improves, outpt MPI; no angina: Didn't F/U as rec; trop again elevated w/o angina. 3) ECG c/w LVH/repolarization changes. 4) HTN  5) Cr 1.3 2/2019 but GFR >60.  6) ?ELE: + overnight pulse Ox;  formal outpt SS rec. 7) Lipids:  2/2019  8) Obesity: 296# 2019; 290# 2020.  9) tobacco use, quit 2/2019:  restarted 2020; quit 2022. Admitted w/ CHF. Echo pending. Trops again 300s. Current cardiac meds: asa; coreg 12.5 bid; lasix 40 IV bid; crestor 10; aldactone 25. Rec:  Cont diuresis; add ARB when back to PO lasix. NTG paste PRN for BP. TSH per primary team. Still needs outpt SS. Echo 9/28/22:  Left Ventricle: Reduced left ventricular systolic function with a visually estimated EF of 25%. Left ventricle is dilated. Moderately increased wall thickness. Global hypokinesis present. Right Ventricle: Right ventricle is dilated. Reduced systolic function. Left Atrium: Left atrium is dilated. Right Atrium: Right atrium is dilated. Pericardium: Small (<1 cm) pericardial effusion present. 9/28: No CP/less dyspnea. BPs 130-160; HR 70-80s; Sinus; 93% 2L; ?I/Os; 354# to 326#. Hg 15.2; WBC 13. Cr 1.42 from 1.57;   Cardiac meds: asa; coreg 25 bid; lasix 40 IV bid; crestor 10; aldactone 25. No new cardiac recs. IV duretics today; wean O2; check sats with ambulation. Echo still pending.     9/29: No CP/less dyspnea  BPs 130-150; HR 70-80s; sinus; % RA at rest.  K 4.3; Cr 1.43; proBNP 1.3k (from 7k). Cardiac meds: asa; coreg 25 bid; lasix 40 IV bid; crestor 10; aldactone 25. Norvasc 5     Wean O2; check sats with ambulation. Change lasix to PO Bumex. Change norvasc to Entresto. Hold on cath: If EF normalizes w/ med Rx/ELE Rx, outpt MPI; if not, cath. Will set up LifeVest (could be as outpt). Dispo per primary team (d/w Dr Jed Prescott). ____________________________________________________________________  Iliana Ray is a 37 y.o. male presents with Acute HFrEF (heart failure with reduced ejection fraction) ([facility] [address] [I50.21]. As noted in H&P: \"37 y.o.  male with pertinent past medical history of essential hypertension, former tobacco use (quit 3 months prior), daily marijuana use, and HFrEF (EF 20-25% in 2019) lost to cardiology follow-up who presents with complaints of bilateral lower extremity swelling for the past 2-3 weeks now involving abdominal swelling and mild shortness of breath with orthopnea. Patient reports no memory of CHF diagnosis and states he was going through a bad divorce at that time and did not follow-up with cardiology as he was directed. He has recently been switched off of his Lasix which was replaced with spironolactone. He denies any other associated symptoms and ROS otherwise negative. He has been referred for sleep study, but has not scheduled the appointment. In the ED, patient afebrile, borderline tachycardic with heart rates around 100 bpm hypertensive 190s/140s saturating mid 90s on 2 L/min by nasal cannula. ECG demonstrates sinus tachycardia with evidence of biatrial enlargement and left axis deviation without definitive ischemic changes (lateral ST changes noted). CXR demonstrates marked cardiomegaly.   Labs demonstrate: Stable troponin 320-> 317, sodium 143, potassium 4.5, BUN 23, creatinine 1.65 (baseline 1.1-1.3), proBNP 7237 (similar to prior admission for CHF), WBC 14.7, hemoglobin 14.2, platelets 505. \"     ______________________________________________________________________     The patient reports no chest pain/pressure; Worse LE edema then MARSHALL/orthpnea for last few weeks. Some sodium in diet; Occ forgets to take meds (few doses/week). No PND, orthopnea, palpitations, pre-syncope, syncope, peripheral edema, or decrease in exercise tolerance. No current complaints. ECG: sinus tachy; LAD; LVH w/ repol changes;  Tele: sinus  CXR: \"The lungs are clear. Marked cardiomegaly. \"     Notable labs: WBC 13; Hg 16; Cr 1.57 (gfr 60) from 1.65; LDL 77; trop 300s; proBNP 7k; TSH 4.3;   ______________________________________________________  Notable prior cardiac history:  @ OV 7/17/20:   1) CM/CHF: acute sys/diast CHF admit 2/2019; Echo 2/5/19: Ef 20-25%, Mild MR; Mild-mod TR; PAp 35;  Likely related to HTN; Nl TSH/ferritin; Neg HIV; wt from 315 to 296;      2) Elevated troponin 2/2019: likely related to CM; Cath if EF not improved with med Rx; if improves, outpt MPI; no angina. 3) ECG c/w LVH/repolarization changes. 4) HTN  5) Cr 1.3 2/2019 but GFR >60.  6) ?ELE: + overnight pulse Ox;  formal outpt SS rec. 7) Lipids:  2/2019  8) Obesity: 296# 2019; 290# 2020.  9) tobacco use, quit 2/2019:  restarted 2020. 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 Occ added salt; Rare EtOH.     2/2019:  Since being discharged, he stop smoking; he has had no chest pain. His MARSHALL has resolved. He still needs to schedule a sleep study but is planning to do so. He is back at work. 7/2020:  Since his last visit, he did not have the recommended blood work, echocardiogram, nor sleep study. He has started smoking again. His Lasix ran out; he is taking Coreg 12.5 bid but occasionally runs out. He took his BP meds today. Finances have been an issue. No chest pain, orthopnea or LE edema. No MARSHALL except in humidity. IMPRESSION AND PLAN  01.  Other cardiomyopathies (I42.8):  Probable hypertensive CM; again needs follow-up echo; if not improved, CTA vs cath. If improves or normalizes, MPI. May need ICD. We discussed the signs and symptoms of unstable angina, myocardial infarction and malignant arrhythmia. The patient knows to seek immediate medical attention should they occur. ECG done 2-D w/CFD Echo to be done. first available   02. Chronic combined systolic (congestive) and diastolic (congestive) heart failure (I50.42):  He appears to be euvolemic. He needs the follow-up labs that were previously ordered. 03. Hypertensive heart disease with heart failure (I11.0): Inadequately controlled. Coreg increased to 25 bid & lisinopril to 40 qday; Add aldactone 25 qday; NP visit in 2 weeks; if BP not normalized & Cr ok, lisinopril to 40 bid. Follow-up labs needed if ACEi or diuretics changed. See Order Plan Return for follow-up with Nurse Practitioner in two weeks. .  The patient was instructed on a low sodium diet. 04. Sleep apnea, unspecified (G47.30):  Still needs a formal sleep study & likely treatment. NPSG (Diagnostic Overnight Sleep Study) to be done   05. Personal hx of nicotine dependence (Z87.891): He was previously abstinent, but has resumed tobacco use. The patient was instructed on tobacco cessation. 06. Body mass index (BMI) 38.0-38.9, adult (V69.58): Improved since last seen. The patient was instructed on AHA diet and regular exercise. ORDERS:  1 ECG done   2 Dietary management education, guidance, and counseling   3 Return for follow-up with Nurse Practitioner in two weeks. 4 NPSG (Diagnostic Overnight Sleep Study). 5 2-D w/ CFD Echocardiogram   6 Have a CMP (Comprehensive Metabolic Panel) drawn at the first available time. 7 Return office visit with Cecy Fofana MD in 3 Months. 8 The patient was instructed on AHA diet and regular exercise. 9 Patient counseled on the following: Low Salt Diet. 10 Patient counseled on the following: Tobacco Cessation. 11 Tobacco cessation counseling         20 MEDICATION LIST  Medication Sig Desc   Aspir-81 81 mg tablet,delayed release take 1 tablet by oral route  every day   carvedilol 25 mg tablet take 1 tablet by oral route 2 times every day with food   lisinopril 40 mg tablet take 1 tablet by oral route  every day   spironolactone 25 mg tablet take 1 Tablet by oral route  every day         For other plans, see orders.   Hospital problem list     Active Hospital Problems    Diagnosis Date Noted    Acute HFrEF (heart failure with reduced ejection fraction) ([facility] 2022        Subjective: Martín Martinez reports       Chest pain X none  consistent with:  Non-cardiac CP         Atypical CP     None now  On going  Anginal CP     Dyspnea  none  at rest  with exertion        x improved  unchanged  worse              PND X none  overnight       Orthopnea X none  improved  unchanged  worse   Presyncope X none  improved  unchanged  worse     Ambulated in hallway without symptoms   Yes   Ambulated in room without symptoms  Yes   Objective:     Physical Exam:  Overall VSSAF;  Visit Vitals  BP (!) 146/111 (BP 1 Location: Right upper arm, BP Patient Position: Sitting)   Pulse 73   Temp 97.5 °F (36.4 °C)   Resp 15   Ht 6' (1.829 m)   Wt 147.7 kg (325 lb 9.9 oz)   SpO2 99%   BMI 44.16 kg/m²     Temp (24hrs), Av.8 °F (36.6 °C), Min:97.3 °F (36.3 °C), Max:98.3 °F (36.8 °C)    Patient Vitals for the past 8 hrs:   Pulse   22 0826 73   22 0724 75   22 0556 73       Patient Vitals for the past 8 hrs:   Resp   22 0826 15   22 0724 19   22 0556 18       Patient Vitals for the past 8 hrs:   BP   22 0826 (!) 146/111   22 0724 (!) 149/109   22 0556 (!) 145/107         Intake/Output Summary (Last 24 hours) at 2022 0942  Last data filed at 2022 2404  Gross per 24 hour   Intake --   Output 3275 ml   Net -3275 ml       General Appearance: Well developed, well nourished, no acute distress. Ears/Nose/Mouth/Throat:   Normal MM; anicteric. JVP: WNL   Resp:   Lungs clear to auscultation bilaterally. Nl resp effort. Cardiovascular:  RRR, S1, S2 normal, no new murmur. No gallop or rub. Abdomen:   Soft, non-tender, bowel sounds are present. Extremities: Min edema bilaterally. Skin:  Neuro: Warm and dry. A/O x3, grossly nonfocal       cath site intact w/o hematoma or new bruit; distal pulse unchanged  Yes   Data Review:     Telemetry independently reviewed x sinus  chronic afib  parox afib  NSVT     ECG independently reviewed  NSR  afib  no significant changes  NSST-Tw chgs     x no new ECG provided for review   Lab results reviewed as noted below. Current medications reviewed as noted below. No results for input(s): PH, PCO2, PO2 in the last 72 hours. No results for input(s): CPK, CKMB, CKNDX, TROIQ in the last 72 hours. Recent Labs     09/29/22  0555 09/28/22  0529 09/28/22  0523 09/27/22  0533 09/26/22 1918     --  140 139 143   K 4.3  --  3.9 3.9 4.5     --  101 103 107   CO2 37*  --  34* 33* 31   BUN 24*  --  21* 20 23*   CREA 1.43*  --  1.42* 1.57* 1.65*   GFRAA >60  --  >60 60* 56*   GLU 94  --  95 108* 115*   PHOS  --   --  4.7  --   --    CA 9.1  --  8.8 8.6 8.4*   ALB  --   --   --   --  2.6*   WBC  --  13.2*  --  13.1* 14.7*   HGB  --  15.2  --  15.9 14.2   HCT  --  48.5  --  51.1* 45.5   PLT  --  309  --  353 348       Lab Results   Component Value Date/Time    Cholesterol, total 126 09/27/2022 01:24 AM    HDL Cholesterol 26 09/27/2022 01:24 AM    LDL, calculated 77.2 09/27/2022 01:24 AM    Triglyceride 114 09/27/2022 01:24 AM    CHOL/HDL Ratio 4.8 09/27/2022 01:24 AM     Recent Labs     09/26/22 1918   AP 81   TP 6.9   ALB 2.6*   GLOB 4.3*       No results for input(s): INR, PTP, APTT, INREXT, INREXT in the last 72 hours.    No components found for: GLPOC    Current Facility-Administered Medications   Medication Dose Route Frequency    amLODIPine (NORVASC) tablet 5 mg  5 mg Oral DAILY    hydrALAZINE (APRESOLINE) 20 mg/mL injection 20 mg  20 mg IntraVENous Q4H PRN    nitroglycerin (NITROBID) 2 % ointment 1 Inch  1 Inch Topical Q6H PRN    carvediloL (COREG) tablet 25 mg  25 mg Oral BID WITH MEALS    aspirin delayed-release tablet 81 mg  81 mg Oral DAILY    spironolactone (ALDACTONE) tablet 25 mg  25 mg Oral DAILY    furosemide (LASIX) injection 40 mg  40 mg IntraVENous Q12H    rosuvastatin (CRESTOR) tablet 20 mg  20 mg Oral QHS    sodium chloride (NS) flush 5-40 mL  5-40 mL IntraVENous Q8H    sodium chloride (NS) flush 5-40 mL  5-40 mL IntraVENous PRN    acetaminophen (TYLENOL) tablet 650 mg  650 mg Oral Q6H PRN    Or    acetaminophen (TYLENOL) suppository 650 mg  650 mg Rectal Q6H PRN    polyethylene glycol (MIRALAX) packet 17 g  17 g Oral DAILY PRN    ondansetron (ZOFRAN ODT) tablet 4 mg  4 mg Oral Q8H PRN    Or    ondansetron (ZOFRAN) injection 4 mg  4 mg IntraVENous Q6H PRN    enoxaparin (LOVENOX) injection 40 mg  40 mg SubCUTAneous Q12H          Rhoda Dorman MD

## 2022-09-29 NOTE — PROGRESS NOTES
0700: Bedside shift change report given to Niranjan Wilkes RN and Rhys Trejo RN (oncoming nurse) by Faith Schmitt RN (offgoing nurse). Report included the following information SBAR, Kardex, Intake/Output, MAR, and Recent Results. 0700: Nelli Albrecht will be documenting on patient. 0940: Patient placed on RA at this time, O2 95% will monitor. 0945: Dr. Nettie Miranda at bedside assessing patient. Patient will need lifevest at D/C, CM made aware. 1000: Discussed patient with Dr. Anthony Garza, will plan for possible D/C tomorrow. Will do O2 challenge and await lifevest set up.     1535: O2 challenge completed, see Rhys Trejo RN note. 1900: I have reviewed and agree with Rhys Trejo RN documentation. 1900: End of Shift Note    Bedside shift change report given to kayla RN (oncoming nurse) by Aidan Odom (offgoing nurse). Report included the following information SBAR, Kardex, Intake/Output, MAR, and Recent Results    Shift worked:  [id number]     Shift summary and any significant changes:     Awaiting life vest, O2 challenge completed, no need for home O2. BP  more controlled. Continue diuresis.       Concerns for physician to address:  none     Zone phone for oncoming shift:   XXX       Activity:  Activity Level: Chair  Number times ambulated in hallways past shift: 2  Number of times OOB to chair past shift: 5    Cardiac:   Cardiac Monitoring: Yes      Cardiac Rhythm: Sinus Rhythm    Access:  Current line(s): PIV     Genitourinary:   Urinary status: voiding    Respiratory:   O2 Device: None (Room air)  Chronic home O2 use?: NO  Incentive spirometer at bedside: N/A       GI:  Last Bowel Movement Date:  (PTA)  Current diet:  ADULT DIET Regular; Low Fat/Low Chol/High Fiber/2 gm Na  Passing flatus: YES  Tolerating current diet: YES       Pain Management:   Patient states pain is manageable on current regimen: YES    Skin:  Karthikeyan Score: 21  Interventions: float heels, increase time out of bed, and PT/OT consult    Patient Safety:  Fall Score:  Total Score: 2  Interventions: call bell in reach  High Fall Risk: Yes    Length of Stay:  Expected LOS: 3d 19h  Actual LOS: 202 [facility]

## 2022-09-30 VITALS
TEMPERATURE: 97.6 F | WEIGHT: 315 LBS | RESPIRATION RATE: 20 BRPM | SYSTOLIC BLOOD PRESSURE: 126 MMHG | HEIGHT: 72 IN | DIASTOLIC BLOOD PRESSURE: 95 MMHG | HEART RATE: 77 BPM | BODY MASS INDEX: 42.66 KG/M2 | OXYGEN SATURATION: 95 %

## 2022-09-30 LAB
ANION GAP SERPL CALC-SCNC: 6 MMOL/L (ref 5–15)
BASOPHILS # BLD: 0.1 K/UL (ref 0–0.1)
BASOPHILS NFR BLD: 1 % (ref 0–1)
BUN SERPL-MCNC: 22 MG/DL (ref 6–20)
BUN/CREAT SERPL: 18 (ref 12–20)
CALCIUM SERPL-MCNC: 8.6 MG/DL (ref 8.5–10.1)
CHLORIDE SERPL-SCNC: 100 MMOL/L (ref 97–108)
CO2 SERPL-SCNC: 31 MMOL/L (ref 21–32)
CREAT SERPL-MCNC: 1.23 MG/DL (ref 0.7–1.3)
DIFFERENTIAL METHOD BLD: ABNORMAL
EOSINOPHIL # BLD: 0.3 K/UL (ref 0–0.4)
EOSINOPHIL NFR BLD: 3 % (ref 0–7)
ERYTHROCYTE [DISTWIDTH] IN BLOOD BY AUTOMATED COUNT: 14.1 % (ref 11.5–14.5)
GLUCOSE SERPL-MCNC: 102 MG/DL (ref 65–100)
HCT VFR BLD AUTO: 49.8 % (ref 36.6–50.3)
HGB BLD-MCNC: 15.7 G/DL (ref 12.1–17)
IMM GRANULOCYTES # BLD AUTO: 0 K/UL (ref 0–0.04)
IMM GRANULOCYTES NFR BLD AUTO: 0 % (ref 0–0.5)
LYMPHOCYTES # BLD: 1.5 K/UL (ref 0.8–3.5)
LYMPHOCYTES NFR BLD: 14 % (ref 12–49)
MAGNESIUM SERPL-MCNC: 2 MG/DL (ref 1.6–2.4)
MCH RBC QN AUTO: 29.4 PG (ref 26–34)
MCHC RBC AUTO-ENTMCNC: 31.5 G/DL (ref 30–36.5)
MCV RBC AUTO: 93.3 FL (ref 80–99)
MONOCYTES # BLD: 1.2 K/UL (ref 0–1)
MONOCYTES NFR BLD: 11 % (ref 5–13)
NEUTS SEG # BLD: 7.3 K/UL (ref 1.8–8)
NEUTS SEG NFR BLD: 71 % (ref 32–75)
NRBC # BLD: 0 K/UL (ref 0–0.01)
NRBC BLD-RTO: 0 PER 100 WBC
PLATELET # BLD AUTO: 340 K/UL (ref 150–400)
PMV BLD AUTO: 10.3 FL (ref 8.9–12.9)
POTASSIUM SERPL-SCNC: 4.1 MMOL/L (ref 3.5–5.1)
PROCALCITONIN SERPL-MCNC: 0.09 NG/ML
RBC # BLD AUTO: 5.34 M/UL (ref 4.1–5.7)
SODIUM SERPL-SCNC: 137 MMOL/L (ref 136–145)
T3 SERPL-MCNC: 91 NG/DL (ref 71–180)
WBC # BLD AUTO: 10.4 K/UL (ref 4.1–11.1)

## 2022-09-30 PROCEDURE — 74011250637 HC RX REV CODE- 250/637: Performed by: INTERNAL MEDICINE

## 2022-09-30 PROCEDURE — 74011250636 HC RX REV CODE- 250/636: Performed by: STUDENT IN AN ORGANIZED HEALTH CARE EDUCATION/TRAINING PROGRAM

## 2022-09-30 PROCEDURE — 74011250637 HC RX REV CODE- 250/637: Performed by: STUDENT IN AN ORGANIZED HEALTH CARE EDUCATION/TRAINING PROGRAM

## 2022-09-30 PROCEDURE — 74011000250 HC RX REV CODE- 250: Performed by: STUDENT IN AN ORGANIZED HEALTH CARE EDUCATION/TRAINING PROGRAM

## 2022-09-30 PROCEDURE — 80048 BASIC METABOLIC PNL TOTAL CA: CPT

## 2022-09-30 PROCEDURE — 84145 PROCALCITONIN (PCT): CPT

## 2022-09-30 PROCEDURE — 83735 ASSAY OF MAGNESIUM: CPT

## 2022-09-30 PROCEDURE — 36415 COLL VENOUS BLD VENIPUNCTURE: CPT

## 2022-09-30 PROCEDURE — 85025 COMPLETE CBC W/AUTO DIFF WBC: CPT

## 2022-09-30 RX ORDER — CARVEDILOL 25 MG/1
25 TABLET ORAL 2 TIMES DAILY WITH MEALS
Qty: 60 TABLET | Refills: 5 | Status: SHIPPED | OUTPATIENT
Start: 2022-09-30

## 2022-09-30 RX ORDER — SPIRONOLACTONE 25 MG/1
25 TABLET ORAL DAILY
Qty: 30 TABLET | Refills: 5 | Status: SHIPPED | OUTPATIENT
Start: 2022-09-30

## 2022-09-30 RX ORDER — ROSUVASTATIN CALCIUM 20 MG/1
20 TABLET, COATED ORAL
Qty: 30 TABLET | Refills: 5 | Status: SHIPPED | OUTPATIENT
Start: 2022-09-30

## 2022-09-30 RX ORDER — BUMETANIDE 1 MG/1
1 TABLET ORAL DAILY
Qty: 30 TABLET | Refills: 5 | Status: SHIPPED | OUTPATIENT
Start: 2022-10-01

## 2022-09-30 RX ADMIN — BUMETANIDE 1 MG: 1 TABLET ORAL at 08:05

## 2022-09-30 RX ADMIN — SODIUM CHLORIDE, PRESERVATIVE FREE 10 ML: 5 INJECTION INTRAVENOUS at 17:39

## 2022-09-30 RX ADMIN — CARVEDILOL 25 MG: 12.5 TABLET, FILM COATED ORAL at 08:10

## 2022-09-30 RX ADMIN — SODIUM CHLORIDE, PRESERVATIVE FREE 10 ML: 5 INJECTION INTRAVENOUS at 05:41

## 2022-09-30 RX ADMIN — SPIRONOLACTONE 25 MG: 25 TABLET ORAL at 10:33

## 2022-09-30 RX ADMIN — ASPIRIN 81 MG: 81 TABLET, COATED ORAL at 07:53

## 2022-09-30 RX ADMIN — SACUBITRIL AND VALSARTAN 1 TABLET: 24; 26 TABLET, FILM COATED ORAL at 10:33

## 2022-09-30 RX ADMIN — CARVEDILOL 25 MG: 12.5 TABLET, FILM COATED ORAL at 17:39

## 2022-09-30 RX ADMIN — ENOXAPARIN SODIUM 40 MG: 100 INJECTION SUBCUTANEOUS at 08:12

## 2022-09-30 NOTE — PROGRESS NOTES
Date: 9/30/2022    Re: Luther Hunt    To whom it may concern;  Please excuse Luther Hunt from work from evening of 9/26/2022 through 9/30/2022 as he was under my care for a medical condition in the hospital. He may return to work on 10/1/2022, resuming their full duties. Please contact me with questions, thank you.       Madison Garza MD  Hospitalist, [facility]  Office [phone]

## 2022-09-30 NOTE — PROGRESS NOTES
Attempted to schedule hospital follow up PCP appointment. Office  will contact the patient with appointment information per office protocol. Pending patient discharge.  Naif Medina, Care Management Assistant

## 2022-09-30 NOTE — DISCHARGE SUMMARY
Hospitalist Discharge Note    NAME: Mary Paris   :  1980   MRN:  [id number]       Admit date: 2022    Discharge date: 22    PCP: Jessi Martinez MD    Discharge Diagnoses:    Acute on chronic Systolic CHF POA LVEF 25 to 30%    Acute respiratory failure with hypoxia POA RA sat 87%, dyspnea, RR to 38    Elevated troponin 320 --> 317 POA suspect type II MI POA    Essential hypertension POA    CKD stage 2 POA     Leukocytosis, suspect reactive     Former smoker     Morbid obesity POA Body mass index is 44.16 kg/m². Code status: Full      Discharge Medications:  Current Discharge Medication List        START taking these medications    Details   bumetanide (BUMEX) 1 mg tablet Take 1 Tablet by mouth daily. Qty: 30 Tablet, Refills: 5      rosuvastatin (CRESTOR) 20 mg tablet Take 1 Tablet by mouth nightly. Indications: excessive fat in the blood  Qty: 30 Tablet, Refills: 5      sacubitril-valsartan (ENTRESTO) 24 mg/26 mg tablet Take 1 Tablet by mouth every twelve (12) hours. Indications: acute on chronic systolic CHF, LVEF 25 to 98%  Qty: 60 Tablet, Refills: 5    Associated Diagnoses: Acute HFrEF (heart failure with reduced ejection fraction) (HCC)           CONTINUE these medications which have CHANGED    Details   carvediloL (COREG) 25 mg tablet Take 1 Tablet by mouth two (2) times daily (with meals). Qty: 60 Tablet, Refills: 5      spironolactone (ALDACTONE) 25 mg tablet Take 1 Tablet by mouth daily. Qty: 30 Tablet, Refills: 5           CONTINUE these medications which have NOT CHANGED    Details   aspirin delayed-release 81 mg tablet Take 1 Tab by mouth daily.   Qty: 30 Tab, Refills: 12           STOP taking these medications       lisinopril (PRINIVIL, ZESTRIL) 20 mg tablet Comments:   Reason for Stopping:         lisinopriL (PRINIVIL, ZESTRIL) 40 mg tablet Comments:   Reason for Stopping:             Continue aspirin 81 mg daily    Coreg and entresto are medicals to help your heart function better and control your blood pressure    Bumex is a fluid pill to help the kidneys excrete excessive salt and water to prevent fluid building up in lungs    Crestor is a medicine to reduce you cholesterol levels      Follow-up Information       Follow up With Specialties Details Why Contact Info    Dina Rinne, MD Family Medicine Follow up in 1 week(s) Dr. Crow Brown office will call you to [facility] follow up and to recheck kidney function and blood pressure. [address]  [phone]      Ana Luisa French MD Cardio Vascular Surgery, Cardiovascular Disease Physician Follow up in 3 week(s)  [address]  [id number]  [address] [phone]              Time spent on discharge:   I spent greater than 30 minutes on discharge, seeing and examining the patient, reconciling home meds and new meds, coordinating care with case management, doing the discharge papers and the D/C summary    Discharge disposition: home    Discharge Condition: Stable    Summary of admission H+P(copied from Dr Guzman Avers Note):     CHIEF COMPLAINT: Shortness of breath, bilateral lower extremity swelling     HISTORY OF PRESENT ILLNESS:     Bren Enriquez is a 37 y.o.  male with pertinent past medical history of essential hypertension, former tobacco use (quit 3 months prior), daily marijuana use, and HFrEF (EF 20-25% in 2019) lost to cardiology follow-up who presents with complaints of bilateral lower extremity swelling for the past 2-3 weeks now involving abdominal swelling and mild shortness of breath with orthopnea. Patient reports no memory of CHF diagnosis and states he was going through a bad divorce at that time and did not follow-up with cardiology as he was directed. He has recently been switched off of his Lasix which was replaced with spironolactone. He denies any other associated symptoms and ROS otherwise negative.   He has been referred for sleep study, but has not scheduled the appointment. In the ED, patient afebrile, borderline tachycardic with heart rates around 100 bpm hypertensive 190s/140s saturating mid 90s on 2 L/min by nasal cannula. ECG demonstrates sinus tachycardia with evidence of biatrial enlargement and left axis deviation without definitive ischemic changes (lateral ST changes noted). CXR demonstrates marked cardiomegaly. Labs demonstrate: Stable troponin 320-> 317, sodium 143, potassium 4.5, BUN 23, creatinine 1.65 (baseline 1.1-1.3), proBNP 7237 (similar to prior admission for CHF), WBC 14.7, hemoglobin 14.2, platelets 109. We were asked to admit for work up and evaluation of the above problems. Past Medical History:   Diagnosis Date    Hypertension      Infectious disease       trichomonas       pCXR read by radiology FINDINGS:   A portable AP radiograph of the chest was obtained at 2030 hours. .  The lungs are clear. Marked cardiomegaly. The bones and soft tissues are  grossly within normal limits. IMPRESSION  Marked cardiomegaly    Echo TTE read by cardiology  Left Ventricle Reduced left ventricular systolic function with a visually estimated EF of 25 - 30%. Left ventricle is dilated. Moderately increased wall thickness. Global hypokinesis present. Left Atrium Left atrium is dilated. Right Ventricle Right ventricle is dilated. Reduced systolic function. Right Atrium Right atrium is dilated. Aortic Valve Tricuspid valve. No regurgitation. No stenosis. Mitral Valve Valve structure is normal. Trace regurgitation. No stenosis noted. Tricuspid Valve Valve structure is normal. No regurgitation. No stenosis noted. Pulmonic Valve The pulmonic valve was not well visualized. Mild regurgitation. No stenosis noted. Aorta Normal sized aorta. Pericardium Small (<1 cm) pericardial effusion present.        Hospital course:      Acute on chronic Systolic CHF POA LVEF 25 to 30%  Acute respiratory failure with hypoxia POA RA sat 87%, dyspnea, RR to 38  Elevated troponin 320 --> 317 POA suspect type II MI POA  Essential hypertension POA  Admitted with SOB and hypoxia and edema  pBNP 7,237  pCXR with marked cardiomegaly  Echo Reduced left ventricular systolic function with a visually estimated EF of 25 - 30%. Required O2 2 liters after admit  IV diuresis, change to PO in AM, additional IV bumex tonight  Strict I's and O's  Daily weights  BP uncontrolled, lisinopril held in anticipation of starting entresto   Temporarily on norvasc   Starting entresto before discharge, BP improved  Continue coreg  As needed IV hydralazine  Counseled on smoking including marijuana cessation  Continue PTA aspirin, statin added  Cardiology holding off on ischemic work-up till outpatient setting  Life vest being set up  D/w patient importance of med compliance, follow up and checking daily weights    CKD stage 2 POA  Creatinine 1.2 to 1.5 at baseline  Watch with diuresis     Leukocytosis, suspect reactive  Stable ~ 10.4 at discharge  No fevers  Procalcitonin 0.09     Former smoker  Daily marijuana use  Celebrated patient successful tobacco cessation. Educated on adverse health effects of marijuana use and recommended cessation     Morbid obesity POA Body mass index is 44.16 kg/m². Code status: Full  Prophylaxis: Lovenox  Recommended Disposition: Home w/Family    Subjective:     Chief Complaint / Reason for Physician Visit: F/U Acute on chronic Systolic CHF, HTN, ? HAZEL/CKD 3, elevated trop  \"Good\". Discussed with RN events overnight.    Off o2, not SOB, feels much better than admit  Overall feels better  Life vest to be set up  No CP or cough    Review of Systems:  Symptom Y/N Comments  Symptom Y/N Comments   Fever/Chills n   Chest Pain n    Poor Appetite    Edema     Cough n   Abdominal Pain n    Sputum    Joint Pain     SOB/MARSHALL n   Pruritis/Rash     Nausea/vomit n   Tolerating PT/OT y    Diarrhea    Tolerating Diet y    Constipation Other       Could NOT obtain due to:      Objective:     VITALS:   Last 24hrs VS reviewed since prior progress note. Most recent are:  Patient Vitals for the past 24 hrs:   Temp Pulse Resp BP SpO2   09/30/22 1056 97.8 °F (36.6 °C) 73 13 (!) 137/96 93 %   09/30/22 0810 -- 76 -- (!) 129/93 --   09/30/22 0805 -- 76 -- (!) 129/93 --   09/30/22 0738 98 °F (36.7 °C) 68 13 (!) 137/96 93 %   09/30/22 0300 98.6 °F (37 °C) 75 27 -- 95 %   09/30/22 0000 98.7 °F (37.1 °C) 70 19 105/81 96 %   09/29/22 2200 -- 86 21 121/79 99 %   09/29/22 1930 99 °F (37.2 °C) 78 18 123/84 94 %   09/29/22 1836 -- 73 -- (!) 128/96 --         Intake/Output Summary (Last 24 hours) at 9/30/2022 1451  Last data filed at 9/30/2022 0300  Gross per 24 hour   Intake 240 ml   Output 2800 ml   Net -2560 ml          I had a face to face encounter and independently examined this patient on 9/30/2022, as outlined below:  PHYSICAL EXAM:  General: WD, WN. Alert, cooperative, no acute distress   EENT:  Anicteric sclerae. MMM  Resp:  CTA bilaterally, no wheezing or rales. No accessory muscle use  CV:  Regular  rhythm, B/L LE pitting edema +  GI:  Soft, Non distended, Non tender. +Bowel sounds  Neurologic:  Alert and oriented X 3, normal speech,   Psych:   Good insight. Not anxious nor agitated  Skin:  No rashes. No jaundice    Reviewed most current lab test results and cultures  YES  Reviewed most current radiology test results   YES  Review and summation of old records today    NO  Reviewed patient's current orders and MAR    YES  PMH/SH reviewed - no change compared to H&P  ________________________________________________________________________  Care Plan discussed with:    Comments   Patient x    Family      RN x    Care Manager x    Consultant                       x Multidiciplinary team rounds were held today with , nursing, pharmacist and clinical coordinator.   Patient's plan of care was discussed; medications were reviewed and discharge planning was addressed. ________________________________________________________________________      Comments   >50% of visit spent in counseling and coordination of care x    ________________________________________________________________________  Tonie Hampton MD     Procedures: see electronic medical records for all procedures/Xrays and details which were not copied into this note but were reviewed prior to creation of Plan. LABS:  I reviewed today's most current labs and imaging studies.   Pertinent labs include:  Recent Labs     09/30/22 0329 09/28/22  0529   WBC 10.4 13.2*   HGB 15.7 15.2   HCT 49.8 48.5    309       Recent Labs     09/30/22  0329 09/29/22  0555 09/28/22  0523    141 140   K 4.1 4.3 3.9    101 101   CO2 31 37* 34*   * 94 95   BUN 22* 24* 21*   CREA 1.23 1.43* 1.42*   CA 8.6 9.1 8.8   MG 2.0  --   --    PHOS  --   --  4.7         Signed: Tonie Hampton MD

## 2022-09-30 NOTE — PROGRESS NOTES
Pt ready for discharge from a CM standpoint. Madeleine pena in room with patient doing a Life Vest teaching, will get him fitted for the LifeVest this afternoon, patient can discharge home after this is completed. Transition of Care Plan:    RUR:  5% low risk for readmission  Disposition: Home with follow up appointments  Follow up appointments: PCP/Caridiologist  DME needed: LifeVest  Transportation at Discharge: girlfriend to transport  Sylvia or means to access home:  patient has access      IM Medicare Letter: Commercial insurance  Caregiver Contact: Melissa Gregory ([phone])  Discharge Caregiver contacted prior to discharge? Caregiver has been contacted  Care Conference needed?:  n/a    Pt needing Derick Dillon has been contacted and was expecting that patient would have to wait for insurance authorization prior to being approved and fitted with life vest that could take several days. CM was informed that patient was safe to discharge home with plans to have LifeVest fitting at home. CM called Madeleine lucero to discuss and he reported that he was already with he patient and working to get the LifeVest fitted prior to discharge today. No other needs identified for patient at this time for discharge. Girlfriend to transport home once LifeVest is delivered and fit. Care Management Interventions  PCP Verified by CM: Yes (UNsure of last visit)  Mode of Transport at Discharge: Other (see comment) (Wife will transport)  Transition of Care Consult (CM Consult): Discharge Planning  Support Systems: Spouse/Significant Other  Confirm Follow Up Transport: Self  Discharge Location  Patient Expects to be Discharged to[de-identified] [facility].  [facility], [address] [place] [id number] [address] [place]  [employer] ACP Facilitator  Zone Phone: [phone]

## 2022-09-30 NOTE — PROGRESS NOTES
Cardiology Progress Note  9/30/2022     Admit Date: 9/26/2022  Admit Diagnosis: Acute HFrEF (heart failure with reduced ejection fraction) ([facility]) [I50.21]  CC: none currently  Cardiologist:  Dr Adamaris Villalobos, last seen in 2020; didn't get F/U echo nor OV rec then. Cardiac Assessment/Plan:    1) CM/CHF: acute sys/diast CHF admit 2/2019; Echo 2/5/19: Ef 20-25%, Mild MR; Mild-mod TR; PAp 35;  Likely related to HTN; Nl TSH/ferritin; Neg HIV; wt from 315 to 296;      2) Elevated troponin 2/2019: likely related to CM; Cath if EF not improved with med Rx; if improves, outpt MPI; no angina: Didn't F/U as rec; trop again elevated w/o angina. 3) ECG c/w LVH/repolarization changes. 4) HTN  5) Cr 1.3 2/2019 but GFR >60.  6) ?ELE: + overnight pulse Ox;  formal outpt SS rec. 7) Lipids:  2/2019  8) Obesity: 296# 2019; 290# 2020.  9) tobacco use, quit 2/2019:  restarted 2020; quit 2022. Admitted w/ CHF. Echo pending. Trops again 300s. Current cardiac meds: asa; coreg 12.5 bid; lasix 40 IV bid; crestor 10; aldactone 25. Rec:  Cont diuresis; add ARB when back to PO lasix. NTG paste PRN for BP. TSH per primary team. Still needs outpt SS. Echo 9/28/22:  Left Ventricle: Reduced left ventricular systolic function with a visually estimated EF of 25%. Left ventricle is dilated. Moderately increased wall thickness. Global hypokinesis present. Right Ventricle: Right ventricle is dilated. Reduced systolic function. Left Atrium: Left atrium is dilated. Right Atrium: Right atrium is dilated. Pericardium: Small (<1 cm) pericardial effusion present. 9/29: No CP/less dyspnea  BPs 130-150; HR 70-80s; sinus; % RA at rest.  K 4.3; Cr 1.43; proBNP 1.3k (from 7k). Cardiac meds: asa; coreg 25 bid; lasix 40 IV bid; crestor 10; aldactone 25. Norvasc 5     Wean O2; check sats with ambulation. Change lasix to PO Bumex. Change norvasc to Entresto. Hold on cath:  If EF normalizes w/ med Rx/ELE Rx, outpt MPI; if not, cath. Will set up LifeVest (could be as outpt). Dispo per primary team (d/w Dr Flo Bennett). 9/30: No CP/dyspnea; ambulated w/o Sx; no desats yesterday. BPs 105-140; HR 70s; sinus; 93-99% RA; 320# from 354# ZACH. NL CBC; Cr 1.23; Normal T3 & T4.    Cardiac meds: asa; coreg 25 bid; Bumex 1 PO qday; crestor 10; aldactone 25. Entresto 24/26 bid. Hold on cath: If EF normalizes w/ med Rx/ELE Rx, outpt MPI; if not, cath. LifeVest pending; LifeVest team aware (could be completed as outpt). Dispo per primary team.  ____________________________________________________________________  Imer Gonzalez is a 37 y.o. male presents with Acute HFrEF (heart failure with reduced ejection fraction) ([facility] [address] [I50.21]. As noted in H&P: \"37 y.o.  male with pertinent past medical history of essential hypertension, former tobacco use (quit 3 months prior), daily marijuana use, and HFrEF (EF 20-25% in 2019) lost to cardiology follow-up who presents with complaints of bilateral lower extremity swelling for the past 2-3 weeks now involving abdominal swelling and mild shortness of breath with orthopnea. Patient reports no memory of CHF diagnosis and states he was going through a bad divorce at that time and did not follow-up with cardiology as he was directed. He has recently been switched off of his Lasix which was replaced with spironolactone. He denies any other associated symptoms and ROS otherwise negative. He has been referred for sleep study, but has not scheduled the appointment. In the ED, patient afebrile, borderline tachycardic with heart rates around 100 bpm hypertensive 190s/140s saturating mid 90s on 2 L/min by nasal cannula. ECG demonstrates sinus tachycardia with evidence of biatrial enlargement and left axis deviation without definitive ischemic changes (lateral ST changes noted). CXR demonstrates marked cardiomegaly.   Labs demonstrate: Stable troponin 320-> 317, sodium 143, potassium 4.5, BUN 23, creatinine 1.65 (baseline 1.1-1.3), proBNP 7237 (similar to prior admission for CHF), WBC 14.7, hemoglobin 14.2, platelets 825. \"     ______________________________________________________________________     The patient reports no chest pain/pressure; Worse LE edema then MARSHALL/orthpnea for last few weeks. Some sodium in diet; Occ forgets to take meds (few doses/week). No PND, orthopnea, palpitations, pre-syncope, syncope, peripheral edema, or decrease in exercise tolerance. No current complaints. ECG: sinus tachy; LAD; LVH w/ repol changes;  Tele: sinus  CXR: \"The lungs are clear. Marked cardiomegaly. \"     Notable labs: WBC 13; Hg 16; Cr 1.57 (gfr 60) from 1.65; LDL 77; trop 300s; proBNP 7k; TSH 4.3;   ______________________________________________________  Notable prior cardiac history:  @ OV 7/17/20:   1) CM/CHF: acute sys/diast CHF admit 2/2019; Echo 2/5/19: Ef 20-25%, Mild MR; Mild-mod TR; PAp 35;  Likely related to HTN; Nl TSH/ferritin; Neg HIV; wt from 315 to 296;      2) Elevated troponin 2/2019: likely related to CM; Cath if EF not improved with med Rx; if improves, outpt MPI; no angina. 3) ECG c/w LVH/repolarization changes. 4) HTN  5) Cr 1.3 2/2019 but GFR >60.  6) ?ELE: + overnight pulse Ox;  formal outpt SS rec. 7) Lipids:  2/2019  8) Obesity: 296# 2019; 290# 2020.  9) tobacco use, quit 2/2019:  restarted 2020. 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 Occ added salt; Rare EtOH.     2/2019:  Since being discharged, he stop smoking; he has had no chest pain. His MARSHALL has resolved. He still needs to schedule a sleep study but is planning to do so. He is back at work. 7/2020:  Since his last visit, he did not have the recommended blood work, echocardiogram, nor sleep study. He has started smoking again. His Lasix ran out; he is taking Coreg 12.5 bid but occasionally runs out. He took his BP meds today. Finances have been an issue.   No chest pain, orthopnea or LE edema. No MARSHALL except in humidity. IMPRESSION AND PLAN  01. Other cardiomyopathies (I42.8):  Probable hypertensive CM; again needs follow-up echo; if not improved, CTA vs cath. If improves or normalizes, MPI. May need ICD. We discussed the signs and symptoms of unstable angina, myocardial infarction and malignant arrhythmia. The patient knows to seek immediate medical attention should they occur. ECG done 2-D w/CFD Echo to be done. first available   02. Chronic combined systolic (congestive) and diastolic (congestive) heart failure (I50.42):  He appears to be euvolemic. He needs the follow-up labs that were previously ordered. 03. Hypertensive heart disease with heart failure (I11.0): Inadequately controlled. Coreg increased to 25 bid & lisinopril to 40 qday; Add aldactone 25 qday; NP visit in 2 weeks; if BP not normalized & Cr ok, lisinopril to 40 bid. Follow-up labs needed if ACEi or diuretics changed. See Order Plan Return for follow-up with Nurse Practitioner in two weeks. .  The patient was instructed on a low sodium diet. 04. Sleep apnea, unspecified (G47.30):  Still needs a formal sleep study & likely treatment. NPSG (Diagnostic Overnight Sleep Study) to be done   05. Personal hx of nicotine dependence (Z87.891): He was previously abstinent, but has resumed tobacco use. The patient was instructed on tobacco cessation. 06. Body mass index (BMI) 38.0-38.9, adult (J60.64): Improved since last seen. The patient was instructed on AHA diet and regular exercise. ORDERS:  1 ECG done   2 Dietary management education, guidance, and counseling   3 Return for follow-up with Nurse Practitioner in two weeks. 4 NPSG (Diagnostic Overnight Sleep Study). 5 2-D w/ CFD Echocardiogram   6 Have a CMP (Comprehensive Metabolic Panel) drawn at the first available time. 7 Return office visit with Thiago Fofana MD in 3 Months.    8 The patient was instructed on AHA diet and regular exercise. 9 Patient counseled on the following: Low Salt Diet. 10 Patient counseled on the following: Tobacco Cessation. 11 Tobacco cessation counseling         20 MEDICATION LIST  Medication Sig Desc   Aspir-81 81 mg tablet,delayed release take 1 tablet by oral route  every day   carvedilol 25 mg tablet take 1 tablet by oral route 2 times every day with food   lisinopril 40 mg tablet take 1 tablet by oral route  every day   spironolactone 25 mg tablet take 1 Tablet by oral route  every day         For other plans, see orders.   Hospital problem list     Active Hospital Problems    Diagnosis Date Noted    Acute HFrEF (heart failure with reduced ejection fraction) ([facility] [address] 2022        Subjective: Richelle Myles reports       Chest pain X none  consistent with:  Non-cardiac CP         Atypical CP     None now  On going  Anginal CP     Dyspnea x none  at rest  with exertion         improved  unchanged  worse              PND X none  overnight       Orthopnea X none  improved  unchanged  worse   Presyncope X none  improved  unchanged  worse     Ambulated in hallway without symptoms   Yes   Ambulated in room without symptoms  Yes   Objective:     Physical Exam:  Overall VSSAF;  Visit Vitals  BP (!) 137/96 (BP 1 Location: Right upper arm, BP Patient Position: At rest)   Pulse 68   Temp 98 °F (36.7 °C)   Resp 13   Ht 6' (1.829 m)   Wt 145.6 kg (320 lb 15.8 oz)   SpO2 93%   BMI 43.53 kg/m²     Temp (24hrs), Av.2 °F (36.8 °C), Min:97.5 °F (36.4 °C), Max:99 °F (37.2 °C)    Patient Vitals for the past 8 hrs:   Pulse   22 0738 68   22 0300 75       Patient Vitals for the past 8 hrs:   Resp   22 0738 13   22 0300 27       Patient Vitals for the past 8 hrs:   BP   22 0738 (!) 137/96         Intake/Output Summary (Last 24 hours) at 2022 0807  Last data filed at 2022 0300  Gross per 24 hour   Intake 720 ml   Output 4450 ml   Net -3730 ml       General Appearance: Well developed, well nourished, no acute distress. Ears/Nose/Mouth/Throat:   Normal MM; anicteric. JVP: WNL   Resp:   Lungs clear to auscultation bilaterally. Nl resp effort. Cardiovascular:  RRR, S1, S2 normal, no new murmur. No gallop or rub. Abdomen:   Soft, non-tender, bowel sounds are present. Extremities: Min edema bilaterally. Skin:  Neuro: Warm and dry. A/O x3, grossly nonfocal       cath site intact w/o hematoma or new bruit; distal pulse unchanged  Yes   Data Review:     Telemetry independently reviewed x sinus  chronic afib  parox afib  NSVT     ECG independently reviewed  NSR  afib  no significant changes  NSST-Tw chgs     x no new ECG provided for review   Lab results reviewed as noted below. Current medications reviewed as noted below. No results for input(s): PH, PCO2, PO2 in the last 72 hours. No results for input(s): CPK, CKMB, CKNDX, TROIQ in the last 72 hours. Recent Labs     09/30/22  0329 09/29/22  0555 09/28/22  0529 09/28/22  0523    141  --  140   K 4.1 4.3  --  3.9    101  --  101   CO2 31 37*  --  34*   BUN 22* 24*  --  21*   CREA 1.23 1.43*  --  1.42*   GFRAA >60 >60  --  >60   * 94  --  95   PHOS  --   --   --  4.7   CA 8.6 9.1  --  8.8   WBC 10.4  --  13.2*  --    HGB 15.7  --  15.2  --    HCT 49.8  --  48.5  --      --  309  --        Lab Results   Component Value Date/Time    Cholesterol, total 126 09/27/2022 01:24 AM    HDL Cholesterol 26 09/27/2022 01:24 AM    LDL, calculated 77.2 09/27/2022 01:24 AM    Triglyceride 114 09/27/2022 01:24 AM    CHOL/HDL Ratio 4.8 09/27/2022 01:24 AM     No results for input(s): AP, TBIL, TP, ALB, GLOB, GGT, AML, LPSE in the last 72 hours. No lab exists for component: SGOT, GPT, AMYP, HLPSE    No results for input(s): INR, PTP, APTT, INREXT, INREXT in the last 72 hours.    No components found for: [place]    Current Facility-Administered Medications   Medication Dose Route Frequency    sacubitriL-valsartan (ENTRESTO) 24-26 mg tablet 1 Tablet  1 Tablet Oral Q12H    bumetanide (BUMEX) tablet 1 mg  1 mg Oral DAILY    hydrALAZINE (APRESOLINE) 20 mg/mL injection 20 mg  20 mg IntraVENous Q4H PRN    nitroglycerin (NITROBID) 2 % ointment 1 Inch  1 Inch Topical Q6H PRN    carvediloL (COREG) tablet 25 mg  25 mg Oral BID WITH MEALS    aspirin delayed-release tablet 81 mg  81 mg Oral DAILY    spironolactone (ALDACTONE) tablet 25 mg  25 mg Oral DAILY    rosuvastatin (CRESTOR) tablet 20 mg  20 mg Oral QHS    sodium chloride (NS) flush 5-40 mL  5-40 mL IntraVENous Q8H    sodium chloride (NS) flush 5-40 mL  5-40 mL IntraVENous PRN    acetaminophen (TYLENOL) tablet 650 mg  650 mg Oral Q6H PRN    Or    acetaminophen (TYLENOL) suppository 650 mg  650 mg Rectal Q6H PRN    polyethylene glycol (MIRALAX) packet 17 g  17 g Oral DAILY PRN    ondansetron (ZOFRAN ODT) tablet 4 mg  4 mg Oral Q8H PRN    Or    ondansetron (ZOFRAN) injection 4 mg  4 mg IntraVENous Q6H PRN    enoxaparin (LOVENOX) injection 40 mg  40 mg SubCUTAneous Q12H          Saúl Pack MD

## 2022-09-30 NOTE — PROGRESS NOTES
1800 Discharge paperwork reviewed with patient. Medication and follow up directions reviewed. No questions form patient at this time. Patient verbalized understanding of the information. Patient safely transported to awaiting vehicle outside with girlfriend driving.

## 2022-09-30 NOTE — DISCHARGE INSTRUCTIONS
Patient Discharge Instructions    Bryce Lyons / [id number] : 1980    Admitted 2022 Discharged: 2022         DISCHARGE DIAGNOSIS:     Acute on chronic systolic CHF      What to do at Home    1. Recommended diet: Cardiac Diet, low salt diet, see below    2. Recommended activity: Activity as tolerated    3. If you experience any of the following symptoms then please call your primary care physician or return to the emergency room if you cannot get hold of your doctor:   Fevers > 100.5, chills   Nausea or vomiting, persistent diarrhea > 24 hours   Blood in stool or black stools   Chest pain or SOB      Follow-up Information         Follow up With Specialties Details Why Contact Info     Lloyd Whitfield MD Family Medicine Follow up in 1 week(s) Dr. Subramanian [facility] office will call you to [facility] follow up and to recheck kidney function and blood pressure. [address] [employer]  [phone]        Rafi Chino MD Cardio Vascular Surgery, Cardiovascular Disease Physician Follow up in 3 week(s)   [address]  [id number]  [place] [id number] [phone]         Continue aspirin 81 mg daily    Coreg and entresto are medicals to help your heart function better and control your blood pressure    Bumex is a fluid pill to help the kidneys excrete excessive salt and water to prevent fluid building up in lungs    Crestor is a medicine to reduce you cholesterol levels    CHF specific discharge instructions    Weight:  Daily weights, Notify your Doctor if Wt gain of 3 lb in a day or 5 lb in a week     Diet :  Low salt cardiac diet   Limit Sodium to 2500 mg per day          Information obtained by :  I understand that if any problems occur once I am at home I am to contact my physician. I understand and acknowledge receipt of the instructions indicated above. Physician's or R.N.'s Signature                                                                  Date/Time                                                                                                                                              Patient or Representative Signature                                                          Date/Time

## 2023-01-23 ENCOUNTER — HOSPITAL ENCOUNTER (OUTPATIENT)
Dept: GENERAL RADIOLOGY | Age: 43
Discharge: HOME | End: 2023-01-23
Payer: COMMERCIAL

## 2023-01-23 ENCOUNTER — TRANSCRIBE ORDER (OUTPATIENT)
Dept: REGISTRATION | Age: 43
End: 2023-01-23

## 2023-01-23 DIAGNOSIS — R93.1 ABNORMAL ECHOCARDIOGRAM: Primary | ICD-10-CM

## 2023-01-23 DIAGNOSIS — I42.8 OBSCURE CARDIOMYOPATHY OF AFRICA (HCC): ICD-10-CM

## 2023-01-23 DIAGNOSIS — I50.42 HEART FAILURE, SYSTOLIC AND DIASTOLIC, CHRONIC (HCC): ICD-10-CM

## 2023-01-23 DIAGNOSIS — R93.1 ABNORMAL ECHOCARDIOGRAM: ICD-10-CM

## 2023-01-23 PROCEDURE — 71046 X-RAY EXAM CHEST 2 VIEWS: CPT

## 2023-01-26 ENCOUNTER — HOSPITAL ENCOUNTER (OUTPATIENT)
Age: 43
Setting detail: OUTPATIENT SURGERY
Discharge: HOME | End: 2023-01-26
Attending: INTERNAL MEDICINE | Admitting: INTERNAL MEDICINE
Payer: COMMERCIAL

## 2023-01-26 VITALS
OXYGEN SATURATION: 92 % | SYSTOLIC BLOOD PRESSURE: 167 MMHG | TEMPERATURE: 98 F | BODY MASS INDEX: 42.66 KG/M2 | HEIGHT: 72 IN | HEART RATE: 82 BPM | RESPIRATION RATE: 17 BRPM | WEIGHT: 315 LBS | DIASTOLIC BLOOD PRESSURE: 113 MMHG

## 2023-01-26 DIAGNOSIS — R93.1 ABNORMAL ECHOCARDIOGRAM: ICD-10-CM

## 2023-01-26 LAB
CRP SERPL HS-MCNC: 5.4 MG/L
ERYTHROCYTE [SEDIMENTATION RATE] IN BLOOD: 19 MM/HR (ref 0–15)

## 2023-01-26 PROCEDURE — 93458 L HRT ARTERY/VENTRICLE ANGIO: CPT | Performed by: INTERNAL MEDICINE

## 2023-01-26 PROCEDURE — 77030015766: Performed by: INTERNAL MEDICINE

## 2023-01-26 PROCEDURE — 77030030195 HC CATH ANGI DX PRF4 MRTM -A: Performed by: INTERNAL MEDICINE

## 2023-01-26 PROCEDURE — 85652 RBC SED RATE AUTOMATED: CPT

## 2023-01-26 PROCEDURE — C1894 INTRO/SHEATH, NON-LASER: HCPCS | Performed by: INTERNAL MEDICINE

## 2023-01-26 PROCEDURE — 77030004549 HC CATH ANGI DX PRF MRTM -A: Performed by: INTERNAL MEDICINE

## 2023-01-26 PROCEDURE — 74011000250 HC RX REV CODE- 250: Performed by: INTERNAL MEDICINE

## 2023-01-26 PROCEDURE — 99153 MOD SED SAME PHYS/QHP EA: CPT | Performed by: INTERNAL MEDICINE

## 2023-01-26 PROCEDURE — 86038 ANTINUCLEAR ANTIBODIES: CPT

## 2023-01-26 PROCEDURE — 74011250636 HC RX REV CODE- 250/636: Performed by: INTERNAL MEDICINE

## 2023-01-26 PROCEDURE — C1769 GUIDE WIRE: HCPCS | Performed by: INTERNAL MEDICINE

## 2023-01-26 PROCEDURE — 77030010221 HC SPLNT WR POS TELE -B: Performed by: INTERNAL MEDICINE

## 2023-01-26 PROCEDURE — 99152 MOD SED SAME PHYS/QHP 5/>YRS: CPT | Performed by: INTERNAL MEDICINE

## 2023-01-26 PROCEDURE — 77030041063 HC CATH DX ANGI DXTRTY MEDT -A: Performed by: INTERNAL MEDICINE

## 2023-01-26 PROCEDURE — 86141 C-REACTIVE PROTEIN HS: CPT

## 2023-01-26 PROCEDURE — 74011250637 HC RX REV CODE- 250/637: Performed by: INTERNAL MEDICINE

## 2023-01-26 PROCEDURE — 36415 COLL VENOUS BLD VENIPUNCTURE: CPT

## 2023-01-26 PROCEDURE — 77030008543 HC TBNG MON PRSS MRTM -A: Performed by: INTERNAL MEDICINE

## 2023-01-26 PROCEDURE — 77030040934 HC CATH DIAG DXTERITY MEDT -A: Performed by: INTERNAL MEDICINE

## 2023-01-26 PROCEDURE — 2709999900 HC NON-CHARGEABLE SUPPLY: Performed by: INTERNAL MEDICINE

## 2023-01-26 PROCEDURE — 74011000636 HC RX REV CODE- 636: Performed by: INTERNAL MEDICINE

## 2023-01-26 PROCEDURE — 77030019569 HC BND COMPR RAD TERU -B: Performed by: INTERNAL MEDICINE

## 2023-01-26 RX ORDER — SODIUM CHLORIDE 0.9 % (FLUSH) 0.9 %
5-40 SYRINGE (ML) INJECTION EVERY 8 HOURS
Status: CANCELLED | OUTPATIENT
Start: 2023-01-26

## 2023-01-26 RX ORDER — HEPARIN SODIUM 200 [USP'U]/100ML
INJECTION, SOLUTION INTRAVENOUS
Status: COMPLETED | OUTPATIENT
Start: 2023-01-26 | End: 2023-01-26

## 2023-01-26 RX ORDER — CARVEDILOL 12.5 MG/1
25 TABLET ORAL
Status: COMPLETED | OUTPATIENT
Start: 2023-01-26 | End: 2023-01-26

## 2023-01-26 RX ORDER — FENTANYL CITRATE 50 UG/ML
INJECTION, SOLUTION INTRAMUSCULAR; INTRAVENOUS AS NEEDED
Status: DISCONTINUED | OUTPATIENT
Start: 2023-01-26 | End: 2023-01-26 | Stop reason: HOSPADM

## 2023-01-26 RX ORDER — NALOXONE HYDROCHLORIDE 0.4 MG/ML
0.4 INJECTION, SOLUTION INTRAMUSCULAR; INTRAVENOUS; SUBCUTANEOUS AS NEEDED
Status: CANCELLED | OUTPATIENT
Start: 2023-01-26

## 2023-01-26 RX ORDER — GUAIFENESIN 100 MG/5ML
81 LIQUID (ML) ORAL
Status: COMPLETED | OUTPATIENT
Start: 2023-01-26 | End: 2023-01-26

## 2023-01-26 RX ORDER — LIDOCAINE HYDROCHLORIDE 10 MG/ML
INJECTION, SOLUTION EPIDURAL; INFILTRATION; INTRACAUDAL; PERINEURAL AS NEEDED
Status: DISCONTINUED | OUTPATIENT
Start: 2023-01-26 | End: 2023-01-26 | Stop reason: HOSPADM

## 2023-01-26 RX ORDER — HYDRALAZINE HYDROCHLORIDE 20 MG/ML
INJECTION INTRAMUSCULAR; INTRAVENOUS AS NEEDED
Status: DISCONTINUED | OUTPATIENT
Start: 2023-01-26 | End: 2023-01-26 | Stop reason: HOSPADM

## 2023-01-26 RX ORDER — HEPARIN SODIUM 1000 [USP'U]/ML
INJECTION, SOLUTION INTRAVENOUS; SUBCUTANEOUS AS NEEDED
Status: DISCONTINUED | OUTPATIENT
Start: 2023-01-26 | End: 2023-01-26 | Stop reason: HOSPADM

## 2023-01-26 RX ORDER — NITROGLYCERIN 400 UG/1
SPRAY ORAL AS NEEDED
Status: DISCONTINUED | OUTPATIENT
Start: 2023-01-26 | End: 2023-01-26 | Stop reason: HOSPADM

## 2023-01-26 RX ORDER — MIDAZOLAM HYDROCHLORIDE 1 MG/ML
INJECTION, SOLUTION INTRAMUSCULAR; INTRAVENOUS AS NEEDED
Status: DISCONTINUED | OUTPATIENT
Start: 2023-01-26 | End: 2023-01-26 | Stop reason: HOSPADM

## 2023-01-26 RX ORDER — SODIUM CHLORIDE 9 MG/ML
100 INJECTION, SOLUTION INTRAVENOUS CONTINUOUS
Status: CANCELLED | OUTPATIENT
Start: 2023-01-26 | End: 2023-01-26

## 2023-01-26 RX ORDER — VERAPAMIL HYDROCHLORIDE 2.5 MG/ML
INJECTION, SOLUTION INTRAVENOUS AS NEEDED
Status: DISCONTINUED | OUTPATIENT
Start: 2023-01-26 | End: 2023-01-26 | Stop reason: HOSPADM

## 2023-01-26 RX ORDER — DIPHENHYDRAMINE HYDROCHLORIDE 50 MG/ML
25 INJECTION, SOLUTION INTRAMUSCULAR; INTRAVENOUS
Status: CANCELLED | OUTPATIENT
Start: 2023-01-26

## 2023-01-26 RX ORDER — SODIUM CHLORIDE 0.9 % (FLUSH) 0.9 %
5-40 SYRINGE (ML) INJECTION AS NEEDED
Status: CANCELLED | OUTPATIENT
Start: 2023-01-26

## 2023-01-26 RX ADMIN — CARVEDILOL 25 MG: 12.5 TABLET, FILM COATED ORAL at 12:05

## 2023-01-26 RX ADMIN — NITROGLYCERIN 0.5 INCH: 20 OINTMENT TOPICAL at 09:49

## 2023-01-26 RX ADMIN — ASPIRIN 81 MG: 81 TABLET, CHEWABLE ORAL at 09:49

## 2023-01-26 NOTE — PROGRESS NOTES
Primary Nurse Nolan Tinsley RN and Yony Jolley RN performed a dual skin assessment on this patient No impairment noted  Karthikeyan score is 23    Mepilex applied to sacrum

## 2023-01-26 NOTE — PROGRESS NOTES
TRANSFER - IN REPORT:    Verbal report received from Carissa on Jeri Roach  being received from Cardiac CAth Lab for routine post - op. Report consisted of patients Situation, Background, Assessment and Recommendations(SBAR). Information from the following report(s) SBAR, Procedure Summary, MAR, and Cardiac Rhythm NSR  was reviewed with the receiving clinician. Opportunity for questions and clarification was provided. Assessment completed upon patients arrival to [address] [place] [address] and care assumed. Cardiac Cath Lab Recovery Arrival Note:    Jeri Roach arrived to [facility] recovery area. Patient procedure= cardiac cath. Patient on cardiac monitor, non-invasive blood pressure, SPO2 monitor. On room air. IV  of NS on pump at kvo ml/hr. Patient status doing well without problems, BP hypertensive, received order for Coreg, called pharmacy. Patient is A&Ox 4. Patient reports no complaints. PROCEDURE SITE CHECK:    Procedure site:without any bleeding and TR band secure at 13 ml, denies pain/discomfort reported at procedure site. No change in patient status. Continue to monitor patient and status.

## 2023-01-26 NOTE — CARDIO/PULMONARY
Cardiac Rehab Note: chart review/referral     Cardiac cath 1/26/23:  \"no focal CAD\"    Smoking history assessed. Patient is a former smoker.    Smoking Cessation Program link has not been added to the AVS.

## 2023-01-26 NOTE — Clinical Note
TRANSFER - OUT REPORT:     Verbal report given to: Daphne Godfrey. Report consisted of patient's Situation, Background, Assessment and   Recommendations(SBAR). Opportunity for questions and clarification was provided. Patient transported with a Registered Nurse. Patient transported to: recovery.

## 2023-01-26 NOTE — PROGRESS NOTES
Cardiac Cath Lab Recovery Arrival Note:      Danni Nichole arrived to Cardiac Cath Lab, Recovery Area. Staff introduced to patient. Patient identifiers verified with NAME and DATE OF BIRTH. Procedure verified with patient. Consent forms reviewed and signed by patient or authorized representative and verified. Allergies verified. Patient and family oriented to department. Patient and family informed of procedure and plan of care. Questions answered with review. Patient prepped for procedure, per orders from physician, prior to arrival.    Patient on cardiac monitor, non-invasive blood pressure, SPO2 monitor. On room air. Patient is A&Ox 4. Patient reports no complaints. Patient in stretcher, in low position, with side rails up, call bell within reach, patient instructed to call if assistance as needed. Patient prep in: [address], [place]. Patient family has pager # n/a  Family in: Renny Muñiz (girlfriend) in [facility]    Prep by: Collette Baston

## 2023-01-26 NOTE — PROGRESS NOTES
1400: Patient discharged escorted to car via wheelchair accompanied by girlfriend    1340: Patient ambulated in the hallway without difficulty. No signs of hematoma and no bleeding at incision site. I have reviewed discharge instructions with the patient and girlfriend. The patient and girlfriend verbalized understanding.

## 2023-01-26 NOTE — DISCHARGE INSTRUCTIONS
[address], [address]    [phone]  [address] [address]    [email or web address]    Patient Discharge Instructions    Jeri Roach / [id number] : 1980    Admitted 2023 Discharged 2023     It is important that you take the medication exactly as they are prescribed. Keep your medication in the bottles provided by the pharmacist and keep a list of the medication names, dosages, and times to be taken in your wallet. Do not take other medications without consulting your doctor. BRING ALL OF YOUR MEDICINES or a list of medicines with dosages TO YOUR OFFICE VISIT with Dr. Chester Isaacs. Cardiac Catheterization  Discharge Instructions  Transradial Catheterization Discharge Instructions (WRIST)     Discharge instructions: Your radial artery in your wrist was used for your cardiac catheterization. This site may be slightly bruised and sore following your procedure. Expect mild tingling or the hand and tenderness at the puncture site for up to 3 days. Excess movement of the wrist used should be avoided for the next 24-48 hours. No lifting over 2 pounds (approximately a ½ gallon of milk) with this arm for 24 hours. Keep the site of the procedure covered with a bandage for 24 hours. You may shower the day after your procedure. Do not take a tub bath or submerge the puncture site in water for 48 hours. No heavy impact activity/lifting > 10 pounds for 1 week. If bleeding of the wrist occurs at home:   If the site on your wrist where you had the catheterization procedure begins to bleed, do not panic. Place 1 or 2 fingers over the puncture site and hold pressure to stop the bleeding. You may be able to feel your pulse as you hold pressure. Lift your fingers after 5 minutes to see if the bleeding has stopped. Once the bleeding has stopped, gently wipe the wrist area clean and cover with a bandage.       If the bleeding from your wrist does not stop after 15 minutes, or if there is a large amount of bleeding or spurting, call 911 immediately (do not drive yourself to the hospital). Other concerns: The site may be slightly bruised and sore following your procedure. Should any of the following occur, contact your physician immediately:  Any cool or coldness of the arm, discoloration over a large area, ongoing numbness or any abnormal sensations , moderate to severe pain or swelling in the arm. Redness, soreness, swelling, chills or fever, or colored drainage at the procedure site within 3-7 days after your procedure. You may return to work  2  days after your cardiac cath if no bleeding at the cath site. If you are smoking, please stop. Smoking Cessation Program is a free, phone/text/email based, smoking cessation program. The program is individualized to meet each patient's needs. To enroll use this link https://Omada.Outernet/ra/survey/2860          Information obtained by :  I understand that if any problems occur once I am at home I am to contact my physician. I understand and acknowledge receipt of the instructions indicated above. R.N.'s Signature                                                                  Date/Time                                                                                                                                              Patient or Representative Signature                                                          Date/Time      Leslie Porter MD      [address], [address]    [phone]  [address] [facility], [address] [place]    [email or web address]

## 2023-01-28 LAB — ANA SER QL: NEGATIVE

## 2024-08-19 ENCOUNTER — HOSPITAL ENCOUNTER (EMERGENCY)
Facility: HOSPITAL | Age: 44
Discharge: HOME | End: 2024-08-19
Payer: COMMERCIAL

## 2024-08-19 VITALS
WEIGHT: 315 LBS | OXYGEN SATURATION: 95 % | RESPIRATION RATE: 18 BRPM | DIASTOLIC BLOOD PRESSURE: 104 MMHG | SYSTOLIC BLOOD PRESSURE: 161 MMHG | HEART RATE: 70 BPM | BODY MASS INDEX: 45.84 KG/M2 | TEMPERATURE: 98.5 F

## 2024-08-19 DIAGNOSIS — R51.9 ACUTE FACIAL PAIN: ICD-10-CM

## 2024-08-19 DIAGNOSIS — R03.0 ELEVATED BLOOD PRESSURE READING: ICD-10-CM

## 2024-08-19 DIAGNOSIS — K08.89 PAIN, DENTAL: Primary | ICD-10-CM

## 2024-08-19 PROCEDURE — 99283 EMERGENCY DEPT VISIT LOW MDM: CPT

## 2024-08-19 PROCEDURE — 6370000000 HC RX 637 (ALT 250 FOR IP): Performed by: PHYSICIAN ASSISTANT

## 2024-08-19 RX ORDER — HYDROCODONE BITARTRATE AND ACETAMINOPHEN 5; 325 MG/1; MG/1
1 TABLET ORAL
Status: COMPLETED | OUTPATIENT
Start: 2024-08-19 | End: 2024-08-19

## 2024-08-19 RX ORDER — PENICILLIN V POTASSIUM 250 MG/1
500 TABLET ORAL
Status: COMPLETED | OUTPATIENT
Start: 2024-08-19 | End: 2024-08-19

## 2024-08-19 RX ORDER — PENICILLIN V POTASSIUM 500 MG/1
500 TABLET ORAL 4 TIMES DAILY
Qty: 28 TABLET | Refills: 0 | Status: SHIPPED | OUTPATIENT
Start: 2024-08-19 | End: 2024-08-26

## 2024-08-19 RX ORDER — CHLORHEXIDINE GLUCONATE ORAL RINSE 1.2 MG/ML
15 SOLUTION DENTAL 2 TIMES DAILY
Qty: 420 ML | Refills: 0 | Status: SHIPPED | OUTPATIENT
Start: 2024-08-19 | End: 2024-09-02

## 2024-08-19 RX ADMIN — PENICILLIN V POTASSIUM 500 MG: 250 TABLET, FILM COATED ORAL at 19:36

## 2024-08-19 RX ADMIN — BENZOCAINE, BUTAMBEN, AND TETRACAINE HYDROCHLORIDE: .028; .004; .004 AEROSOL, SPRAY TOPICAL at 19:31

## 2024-08-19 RX ADMIN — HYDROCODONE BITARTRATE AND ACETAMINOPHEN 1 TABLET: 5; 325 TABLET ORAL at 19:36

## 2024-08-19 ASSESSMENT — PAIN SCALES - GENERAL
PAINLEVEL_OUTOF10: 6
PAINLEVEL_OUTOF10: 9

## 2024-08-19 ASSESSMENT — PAIN DESCRIPTION - LOCATION
LOCATION: TEETH
LOCATION: MOUTH

## 2024-08-19 ASSESSMENT — PAIN DESCRIPTION - DESCRIPTORS
DESCRIPTORS: ACHING;SORE
DESCRIPTORS: ACHING;THROBBING

## 2024-08-19 ASSESSMENT — LIFESTYLE VARIABLES
HOW MANY STANDARD DRINKS CONTAINING ALCOHOL DO YOU HAVE ON A TYPICAL DAY: 1 OR 2
HOW OFTEN DO YOU HAVE A DRINK CONTAINING ALCOHOL: MONTHLY OR LESS

## 2024-08-19 ASSESSMENT — PAIN DESCRIPTION - PAIN TYPE: TYPE: ACUTE PAIN;CHRONIC PAIN

## 2024-08-19 ASSESSMENT — PAIN DESCRIPTION - FREQUENCY: FREQUENCY: CONTINUOUS

## 2024-08-19 ASSESSMENT — PAIN - FUNCTIONAL ASSESSMENT: PAIN_FUNCTIONAL_ASSESSMENT: 0-10

## 2024-08-19 ASSESSMENT — PAIN DESCRIPTION - ORIENTATION
ORIENTATION: UPPER;LEFT
ORIENTATION: LEFT

## 2024-08-19 ASSESSMENT — PAIN DESCRIPTION - ONSET: ONSET: PROGRESSIVE

## 2024-08-19 NOTE — ED NOTES
Pt presents to ED complaining of dental pain started about 2-3 weeks . Pt report pain on the left upper gums, describes as aching with pain score of 6/10. Pt denies fever, nausea and vomiting. Pt took Ibuprofen around 7 am today, with mild relief.  Pt is alert and oriented x 4, RR even and unlabored, skin is warm and dry. Assesment completed and pt updated on plan of care.       Emergency Department Nursing Plan of Care       The Nursing Plan of Care is developed from the Nursing assessment and Emergency Department Attending provider initial evaluation.  The plan of care may be reviewed in the “ED Provider note”.    The Plan of Care was developed with the following considerations:   Presenting ambulatory assessment: Ambulating at baseline  Patient / Family readiness to learn indicated by: verbalized understanding  Persons(s) to be included in education: patient   Barriers to Learning/Limitations: None    Signed     CHARIS AUGUSTIN RN    8/19/2024   6:49 PM

## 2024-08-19 NOTE — ED TRIAGE NOTES
Pt presents ambulatory to the ED c/o left upper dental pain x 3 weeks with worsening symptoms and possible abscess x 2 days. Pt has been using Orajel and Ibuprofen for pain and swelling. Pt reports pus is present.     Pt BP is elevated on arrival. Pt is taking BP medications as prescribed.

## 2024-08-19 NOTE — ED PROVIDER NOTES
TO:  [facility] EMERGENCY DEPT  [address]  [facility] [address]  [phone]    As needed, If symptoms worsen    Mary Markham MD  [address] [facility] [address] [phone]    Schedule an appointment as soon as possible for a visit   Monitor blood pressures, keep a log and follow-up with your primary care provider given elevated blood pressure reading here in the emergency department    [facility]  [address]  [address]  [facility] [address] [phone]            DISCHARGE MEDICATIONS:     Medication List        START taking these medications      chlorhexidine 0.12 % solution  Commonly known as: Peridex  Swish and spit 15 mLs 2 times daily for 14 days     penicillin v potassium 500 MG tablet  Commonly known as: VEETID  Take 1 tablet by mouth 4 times daily for 7 days            ASK your doctor about these medications      aspirin 81 MG EC tablet     bumetanide 1 MG tablet  Commonly known as: BUMEX     carvedilol 25 MG tablet  Commonly known as: COREG     rosuvastatin 20 MG tablet  Commonly known as: CRESTOR     sacubitril-valsartan 24-26 MG per tablet  Commonly known as: ENTRESTO     spironolactone 25 MG tablet  Commonly known as: ALDACTONE               Where to Get Your Medications        These medications were sent to [facility] [address] [place] [address] [address] [facility] P [phone] - F [phone]  [address] [facility] [address]      Phone: [phone]   chlorhexidine 0.12 % solution  penicillin v potassium 500 MG tablet           DISCONTINUED MEDICATIONS:  Discharge Medication List as of 8/19/2024  7:25 PM              I am the Primary Clinician of Record.   YOHANA Lopez (electronically signed)    (Please note that parts of this dictation were completed with voice recognition software. Quite often unanticipated grammatical, syntax, homophones, and other interpretive errors are inadvertently transcribed by the computer software. Please disregards these

## 2024-08-19 NOTE — DISCHARGE INSTRUCTIONS
Antibiotics as prescribed  Dental balls as directed, as needed for pain  Monitor blood pressures and follow-up with your primary care provider  Follow-up with dentist as soon as possible  Return precautions as we discussed      Thank You!    It was a pleasure taking care of you in our Emergency Department today. We know that when you come to [facility], you are entrusting us with your health, comfort, and safety. Our clinicians honor that trust, and truly appreciate the opportunity to care for you and your loved ones.    If you receive a survey about your Emergency Department experience today, please fill it out.  We value your feedback. Thank you.      Dorothy Carbone PA-C    ___________________________________  I have included a copy of your lab results and/or radiologic studies from today's visit so you can have them easily available at your follow-up visit.   No results found for this or any previous visit (from the past 12 hour(s)).    No orders to display     [unfilled]          Dentist/Dental resources:    Emergency Dental Care   [facility]   [address] [address]   Monday, Wednesday, Friday: 8am-5pm   Tuesday and Thursday: 8am-6pm   Phone: [phone]   $70 for Emergency Care   $60 for first routine care, then pay by sliding scale based upon income     [address] [address]   Phone: [phone], select option (2) to confirm time for treatment     [facility]   [address] [place] [address]   Monday-Friday: 8am-4pm   Phone: [phone]     [facility]   [facility], [facility], [facility] [address]   Phone: [phone] to confirm a time for emergency treatment   Pediatrics: [phone]   $75 per tooth, extractions only     [facility] ([facility]) [address] [address] [phone]    [facility]

## 2024-08-19 NOTE — ED NOTES
Discharge instructions were given to the patient by CHARIS AUGUSTIN RN.     The patient left the Emergency Department alert and oriented and in no acute distress with 2 prescriptions. The patient was encouraged to call or return to the ED for worsening issues or problems and was encouraged to schedule a follow up appointment for continuing care.     Ambulation assessment completed before discharge.  Pt left Emergency Department ambulating at baseline with no ortho devices  Ortho device education: none    The patient verbalized understanding of discharge instructions and prescriptions, all questions were answered. The patient has no further concerns at this time.      59

## (undated) DEVICE — CATH 5F 110CM PG145 -- DXTERITY

## (undated) DEVICE — CATHETER ANGIO JL3.5 AD 5 FRX100 CM PERFORMA

## (undated) DEVICE — CATHETER DIAG 5FR L100CM SPEC 3 DRC CRV SZ DBL BRAID WIRE

## (undated) DEVICE — CATHETER ANGIO JR4 AD 5 FRX100 CM 25 CM PERFORMA

## (undated) DEVICE — HI-TORQUE VERSACORE FLOPPY GUIDE WIRE SYSTEM 145 CM: Brand: HI-TORQUE VERSACORE

## (undated) DEVICE — RADIFOCUS OPTITORQUE ANGIOGRAPHIC CATHETER: Brand: OPTITORQUE

## (undated) DEVICE — GUIDEWIRE VASC L260CM 0.035IN J TIP L3MM PTFE FIX COR NAMIC

## (undated) DEVICE — PACK PROCEDURE SURG HRT CATH

## (undated) DEVICE — PROVE COVER: Brand: UNBRANDED

## (undated) DEVICE — GLIDESHEATH SLENDER ACCESS KIT: Brand: GLIDESHEATH SLENDER

## (undated) DEVICE — SPLINT WR POS F/ARTERIAL ACC -- BX/10

## (undated) DEVICE — TUBING PRSS MON L6IN PVC M FEM CONN

## (undated) DEVICE — TR BAND RADIAL ARTERY COMPRESSION DEVICE: Brand: TR BAND